# Patient Record
Sex: MALE | Race: WHITE | NOT HISPANIC OR LATINO | Employment: PART TIME | URBAN - NONMETROPOLITAN AREA
[De-identification: names, ages, dates, MRNs, and addresses within clinical notes are randomized per-mention and may not be internally consistent; named-entity substitution may affect disease eponyms.]

---
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
## 2023-03-09 ENCOUNTER — HOSPITAL ENCOUNTER (EMERGENCY)
Facility: HOSPITAL | Age: 23
Discharge: HOME | End: 2023-03-09
Attending: NURSE PRACTITIONER | Admitting: NURSE PRACTITIONER
Payer: COMMERCIAL

## 2023-03-09 VITALS
RESPIRATION RATE: 16 BRPM | SYSTOLIC BLOOD PRESSURE: 154 MMHG | DIASTOLIC BLOOD PRESSURE: 94 MMHG | OXYGEN SATURATION: 99 % | TEMPERATURE: 97.8 F | HEART RATE: 64 BPM

## 2023-03-09 DIAGNOSIS — H60.02 ABSCESS OF EARLOBE, LEFT: Primary | ICD-10-CM

## 2023-03-09 PROCEDURE — 99213 OFFICE O/P EST LOW 20 MIN: CPT | Performed by: NURSE PRACTITIONER

## 2023-03-09 PROCEDURE — G0463 HOSPITAL OUTPT CLINIC VISIT: HCPCS

## 2023-03-09 RX ORDER — SULFAMETHOXAZOLE/TRIMETHOPRIM 800-160 MG
1 TABLET ORAL 2 TIMES DAILY
Qty: 14 TABLET | Refills: 0 | Status: SHIPPED | OUTPATIENT
Start: 2023-03-09 | End: 2023-03-16

## 2023-03-09 ASSESSMENT — ENCOUNTER SYMPTOMS
SHORTNESS OF BREATH: 0
NAUSEA: 0
CHILLS: 0
FEVER: 0
PSYCHIATRIC NEGATIVE: 1
VOMITING: 0
DIARRHEA: 0
NECK PAIN: 0
NECK STIFFNESS: 0

## 2023-03-09 NOTE — ED TRIAGE NOTES
Patient presents to urgent care with c/o a bump behind his left ear. States he noticed it yesterday. States its uncomfortable and does have pressure into his jaw. Denies any dental issues, fever, chills, n/v.     Triage Assessment     Row Name 03/09/23 4299       Triage Assessment (Adult)    Airway WDL WDL       Respiratory WDL    Respiratory WDL WDL       Skin Circulation/Temperature WDL    Skin Circulation/Temperature WDL WDL       Cardiac WDL    Cardiac WDL WDL       Peripheral/Neurovascular WDL    Peripheral Neurovascular WDL WDL       Cognitive/Neuro/Behavioral WDL    Cognitive/Neuro/Behavioral WDL WDL

## 2023-03-09 NOTE — DISCHARGE INSTRUCTIONS
Bactrim as ordered  - Take entire course of antibiotic even if you start to feel better.  - Antibiotics can cause stomach upset including nausea and diarrhea. Read your bottle or ask the pharmacist if antibiotic can be taken with food to help prevent nausea. If you have symptoms of diarrhea you can take an over-the-counter probiotic and/or increase foods with probiotics such as yogurt, [place], sauerkraut.    Warm compresses to area 3-4 times a day    Alternate tylenol and ibuprofen as needed for pain    Follow-up with primary care provider or return to urgent care/ED with any worsening in condition or additional concerns.

## 2023-03-09 NOTE — ED PROVIDER NOTES
History     Chief Complaint   Patient presents with     Mass     HPI  Yovanny Bonner is a 22 year old male who presents to urgent care today ambulatory with complaints of an abscess to back of left earlobe which started 2 days ago.  Scant amount of purulent drainage.  Denies any inner ear pain, tinnitus, dizziness, vertigo or hearing loss.  Denies any fever, chills, nausea, vomiting, diarrhea, shortness of breath or chest pain.  Denies any dental issues. No trismus.  No difficulty swallowing.  No other concerns.    Allergies:  Allergies   Allergen Reactions     Banana Itching     Kiwi Extract Itching     Seasonal Allergies      Zoloft [Sertraline] Hives       Problem List:    Patient Active Problem List    Diagnosis Date Noted     Dental caries 01/24/2018     Priority: Medium     ADHD, hyperactive-impulsive type 10/02/2015     Priority: Medium     Autism spectrum disorder 03/13/2014     Priority: Medium     MARIBEL (generalized anxiety disorder) 03/13/2014     Priority: Medium     ADHD 07/26/2011     Priority: Medium     Other specified pervasive developmental disorders, current or active state 07/26/2011     Priority: Medium        Past Medical History:    Past Medical History:   Diagnosis Date     Anxiety disorder      Autistic disorder      Pervasive developmental disorder        Past Surgical History:    Past Surgical History:   Procedure Laterality Date     DENTAL SURGERY      No Comments Provided       Family History:    Family History   Problem Relation Age of Onset     Unknown/Adopted Father      No Known Problems Sister      No Known Problems Sister        Social History:  Marital Status:  Single [1]  Social History     Tobacco Use     Smoking status: Some Days     Smokeless tobacco: Never   Substance Use Topics     Alcohol use: No     Drug use: Not Currently     Types: Marijuana     Comment: Drug use: No        Medications:    sulfamethoxazole-trimethoprim (BACTRIM DS) 800-160 MG tablet      Review of Systems    Constitutional: Negative for chills and fever.   HENT: Positive for ear pain (Earlobe). Negative for dental problem, hearing loss and tinnitus.    Respiratory: Negative for shortness of breath.    Cardiovascular: Negative for chest pain.   Gastrointestinal: Negative for diarrhea, nausea and vomiting.   Musculoskeletal: Negative for neck pain and neck stiffness.   Skin: Negative for rash.   Psychiatric/Behavioral: Negative.      Physical Exam   BP: 154/94  Pulse: 64  Temp: 97.8  F (36.6  C)  Resp: 16  SpO2: 99 %    Physical Exam  Vitals and nursing note reviewed.   Constitutional:       General: He is not in acute distress.     Appearance: Normal appearance. He is not ill-appearing or toxic-appearing.   HENT:      Head: Normocephalic.      Right Ear: Tympanic membrane, ear canal and external ear normal. No mastoid tenderness.      Left Ear: Tympanic membrane, ear canal and external ear normal. No mastoid tenderness.      Ears:      Comments: Small abscess to back of left earlobe < 1 cm, scant amount of purulent drainage. No mastoid tenderness.   Cardiovascular:      Rate and Rhythm: Normal rate and regular rhythm.      Pulses: Normal pulses.      Heart sounds: Normal heart sounds.   Pulmonary:      Effort: Pulmonary effort is normal.      Breath sounds: Normal breath sounds.   Neurological:      Mental Status: He is alert.   Psychiatric:         Mood and Affect: Mood normal.       ED Course     No results found for this or any previous visit (from the past 24 hour(s)).    Medications - No data to display    Assessments & Plan (with Medical Decision Making)     I have reviewed the nursing notes.    I have reviewed the findings, diagnosis, plan and need for follow up with the patient.  (H60.02) Abscess of earlobe, left  (primary encounter diagnosis)  Plan:   Patient ambulatory with a nontoxic appearance.  Patient has a small abscess under 1 cm to back of earlobe with scant amount of purulent drainage.  Patient does  not want I&D completed.  No mastoid tenderness.  Denies any fever, chills, nausea, vomiting, diarrhea, shortness of breath or chest pain.  Denies any dental pain or trismus.  Denies any neck pain or stiffness.  Will treat with Bactrim.  Warm compresses to area 3-4 times a day.  Patient to follow-up with primary care provider or return to urgent care/ED with any worsening in condition or additional concerns.  Patient is in agreement with treatment plan.    Discharge Medication List as of 3/9/2023  3:50 PM      START taking these medications    Details   sulfamethoxazole-trimethoprim (BACTRIM DS) 800-160 MG tablet Take 1 tablet by mouth 2 times daily for 7 days, Disp-14 tablet, R-0, E-Prescribe           Final diagnoses:   Abscess of earlobe, left     3/9/2023   [facility]     Katy Nunez, BRAD  03/09/23 160

## 2023-03-09 NOTE — ED TRIAGE NOTES
Developed lump behind left ear yesterday states it 1/10 for discomfort. No drainage or redness noted     Triage Assessment     Row Name 03/09/23 9810       Triage Assessment (Adult)    Airway WDL WDL       Respiratory WDL    Respiratory WDL WDL       Skin Circulation/Temperature WDL    Skin Circulation/Temperature WDL WDL       Cardiac WDL    Cardiac WDL WDL       Peripheral/Neurovascular WDL    Peripheral Neurovascular WDL WDL       Cognitive/Neuro/Behavioral WDL    Cognitive/Neuro/Behavioral WDL WDL

## 2023-12-08 ENCOUNTER — OFFICE VISIT (OUTPATIENT)
Dept: FAMILY MEDICINE | Facility: OTHER | Age: 23
End: 2023-12-08
Attending: PHYSICIAN ASSISTANT
Payer: COMMERCIAL

## 2023-12-08 VITALS
SYSTOLIC BLOOD PRESSURE: 116 MMHG | WEIGHT: 149.6 LBS | OXYGEN SATURATION: 97 % | TEMPERATURE: 98.1 F | HEIGHT: 73 IN | BODY MASS INDEX: 19.83 KG/M2 | HEART RATE: 89 BPM | RESPIRATION RATE: 14 BRPM | DIASTOLIC BLOOD PRESSURE: 70 MMHG

## 2023-12-08 DIAGNOSIS — F90.9 ATTENTION DEFICIT HYPERACTIVITY DISORDER (ADHD), UNSPECIFIED ADHD TYPE: ICD-10-CM

## 2023-12-08 DIAGNOSIS — Z00.00 ROUTINE HISTORY AND PHYSICAL EXAMINATION OF ADULT: Primary | ICD-10-CM

## 2023-12-08 DIAGNOSIS — F41.9 ANXIETY AND DEPRESSION: ICD-10-CM

## 2023-12-08 DIAGNOSIS — F84.0 AUTISM SPECTRUM DISORDER: ICD-10-CM

## 2023-12-08 DIAGNOSIS — F32.A ANXIETY AND DEPRESSION: ICD-10-CM

## 2023-12-08 DIAGNOSIS — Z72.0 TOBACCO USE: ICD-10-CM

## 2023-12-08 PROCEDURE — 99395 PREV VISIT EST AGE 18-39: CPT | Performed by: PHYSICIAN ASSISTANT

## 2023-12-08 PROCEDURE — 99214 OFFICE O/P EST MOD 30 MIN: CPT | Mod: 25 | Performed by: PHYSICIAN ASSISTANT

## 2023-12-08 PROCEDURE — G0463 HOSPITAL OUTPT CLINIC VISIT: HCPCS

## 2023-12-08 ASSESSMENT — ENCOUNTER SYMPTOMS
ARTHRALGIAS: 1
HEMATURIA: 0
EYE PAIN: 0
ABDOMINAL PAIN: 0
CHILLS: 0
HEMATOCHEZIA: 0
PARESTHESIAS: 0
DIZZINESS: 1
HEARTBURN: 0
SHORTNESS OF BREATH: 0
PALPITATIONS: 0
WEAKNESS: 0
DIARRHEA: 0
NERVOUS/ANXIOUS: 1
JOINT SWELLING: 0
HEADACHES: 1
COUGH: 1
DYSURIA: 0
CONSTIPATION: 0
SORE THROAT: 0
FREQUENCY: 0
MYALGIAS: 1
NAUSEA: 0
FEVER: 0

## 2023-12-08 ASSESSMENT — PATIENT HEALTH QUESTIONNAIRE - PHQ9
SUM OF ALL RESPONSES TO PHQ QUESTIONS 1-9: 12
SUM OF ALL RESPONSES TO PHQ QUESTIONS 1-9: 12
10. IF YOU CHECKED OFF ANY PROBLEMS, HOW DIFFICULT HAVE THESE PROBLEMS MADE IT FOR YOU TO DO YOUR WORK, TAKE CARE OF THINGS AT HOME, OR GET ALONG WITH OTHER PEOPLE: SOMEWHAT DIFFICULT

## 2023-12-08 ASSESSMENT — PAIN SCALES - GENERAL: PAINLEVEL: NO PAIN (0)

## 2023-12-08 NOTE — NURSING NOTE
Patient presents to clinic for annual physical and to discuss medical marijuana card.  Sujata Allen LPN ....................  12/8/2023   8:38 AM  EXT [id number]

## 2023-12-08 NOTE — COMMUNITY RESOURCES LIST (ENGLISH)
12/08/2023   [facility]  N/A  For questions about this resource list or additional care needs, please contact your primary care clinic or care manager.  Phone: [phone]   Email: N/A   Address: [address] [place] [id number]   Hours: N/A        Financial Stability       Utility payment assistance  1  [employer] [facility] Office - Energy Assistance Program Distance: 0.69 miles      Phone/Virtual   [address] [place] [address]  Language: English  Hours: Mon - Thu 8:00 AM - 4:30 PM , Fri 8:00 AM - 12:00 PM  Fees: Free   Phone: (971) 792-3466 Email: max@[employer] Website: http://www.[employer]          Food and Nutrition       Food pantry  2  [facility] Distance: 1.8 miles      In-Person   [address] [place] [address]  Language: English  Hours: Mon - Thu 11:00 AM - 3:30 PM  Fees: Free   Phone: (219) 798-3336 Email: info@[employer] Website: https://[employer]     3  [facility] Food Shelf Distance: 14.72 miles      [facility]   [address] [place] [address]  Language: English  Hours: Thu 10:00 AM - 1:00 PM  Fees: Free   Phone: (311) 717-8490 Email: arnoldo@[employer] Website: https://www.[employer].com/[employer]/     SNAP application assistance  4  [facility] Distance: 0.55 miles      [address] [place] [address]  Language: English  Hours: Mon - Fri 8:00 AM - 4:30 PM  Fees: Free   Phone: (462) 936-6804 Website: https://www.[facility]./[facility]/Health-Human-Services     5  [facility] [employer] [facility] Distance: 0.56 miles      In-Person, Phone/Virtual   [address] [place] [address]  Language: English  Hours: Mon 8:00 AM - 4:30 PM Appt. Only, Tue - Thu 8:00 AM - 4:30 PM , Fri 8:00 AM - 4:30 PM Appt. Only  Fees: Free   Phone: (471) 805-9569 Website:  http://www.[employer].org          Transportation       Free or low-cost transportation  6  [facility] [employer] [facility] [employer] - Free or low-cost transportation Distance: 0.56 miles      In-Person   [address] [place] [address]  Language: English  Hours: Mon 8:00 AM - 4:30 PM Appt. Only, Tue - Thu 8:00 AM - 4:30 PM , Fri 8:00 AM - 4:30 PM Appt. Only  Fees: Free   Phone: (616) 107-7013 Website: http://www.[employer]          Important Numbers & Websites       Emergency Services   911  City Services   311  Poison Control   (616) 436-3736  Suicide Prevention Lifeline   (370) 432-1758 (TALK)  Child Abuse Hotline   (739) 925-3652 (4-A-Child)  Sexual Assault Hotline   (197) 197-8055 (HOPE)  National Runaway Safeline   (578) 267-9874 (RUNAWAY)  All-Options Talkline   (269) 850-5317  Substance Abuse Referral   (335) 792-2121 (HELP)

## 2023-12-08 NOTE — PROGRESS NOTES
"SUBJECTIVE:   Yovanny is a 23 year old, presenting for the following:  Physical        Healthy Habits:     Getting at least 3 servings of Calcium per day:  Yes    Bi-annual eye exam:  Yes    Dental care twice a year:  Yes    Sleep apnea or symptoms of sleep apnea:  None and Sleep apnea    Diet:  Regular (no restrictions)    Frequency of exercise:  2-3 days/week    Duration of exercise:  15-30 minutes    Taking medications regularly:  Yes    Medication side effects:  None    Additional concerns today:  No    Here for annual physical.     Has longstanding difficulties with ADHD, anxiety, depression. Known ASD. Has been on medications previously reports mental improvement or side effects.  Patient does have therapy scheduled for later this month.  He would like to have a diagnostic assessment completed.  He is also interested in obtaining medical marijuana card.  Reports when he uses marijuana helps him better with focus, helps control mood better.    STD concerns: No  Cholesterol/DM concerns/screening: NA  Prostate cancer screening discussed: Not indicated, patient is average risk and younger than 50.  Family history of colon or prostate CA?: Unknown  Colonoscopy: Not indicated, patient is average risk and younger than 45.  Tobacco use: Yes, not interested in cesation  Immunizations: Declined flu and COVID      Social History     Tobacco Use    Smoking status: Every Day     Types: Cigarettes    Smokeless tobacco: Never   Substance Use Topics    Alcohol use: No             12/8/2023     8:49 AM   Alcohol Use   Prescreen: >3 drinks/day or >7 drinks/week? No       Last PSA: No results found for: \"PSA\"    Reviewed orders with patient. Reviewed health maintenance and updated orders accordingly - Yes      Reviewed and updated as needed this visit by clinical staff   Tobacco  Allergies  Meds      Soc Hx        Reviewed and updated as needed this visit by Provider                 Past Medical History:   Diagnosis Date    " "Anxiety disorder     No Comments Provided    Autistic disorder     No Comments Provided    Pervasive developmental disorder     and Spectrum disorder diagnosed by psychiatry in [place].      Past Surgical History:   Procedure Laterality Date    DENTAL SURGERY      No Comments Provided       Review of Systems   Constitutional:  Negative for chills and fever.   HENT:  Negative for congestion, ear pain, hearing loss and sore throat.    Eyes:  Negative for pain and visual disturbance.   Respiratory:  Positive for cough. Negative for shortness of breath.    Cardiovascular:  Negative for chest pain, palpitations and peripheral edema.   Gastrointestinal:  Negative for abdominal pain, constipation, diarrhea, heartburn, hematochezia and nausea.   Genitourinary:  Negative for dysuria, frequency, genital sores, hematuria, impotence, penile discharge and urgency.   Musculoskeletal:  Positive for arthralgias and myalgias. Negative for joint swelling.   Skin:  Negative for rash.   Neurological:  Positive for dizziness and headaches. Negative for weakness and paresthesias.   Psychiatric/Behavioral:  Positive for mood changes. The patient is nervous/anxious.          OBJECTIVE:   /70   Pulse 89   Temp 98.1  F (36.7  C)   Resp 14   Ht 1.854 m (6' 1\")   Wt 67.9 kg (149 lb 9.6 oz)   SpO2 97%   BMI 19.74 kg/m      Physical Exam  GENERAL: healthy, alert and no distress  EYES: Eyes grossly normal to inspection, PERRL and conjunctivae and sclerae normal  HENT: ear canals and TM's normal, nose and mouth without ulcers or lesions  NECK: no adenopathy, no asymmetry, masses, or scars and thyroid normal to palpation  RESP: lungs clear to auscultation - no rales, rhonchi or wheezes  CV: regular rate and rhythm, normal S1 S2, no S3 or S4, no murmur, click or rub, no peripheral edema and peripheral pulses strong  MS: no gross musculoskeletal defects noted, no edema  SKIN: no suspicious lesions or rashes  NEURO: Normal strength and " tone, mentation intact and speech normal  PSYCH: mentation appears normal, affect normal/bright    Diagnostic Test Results:  none     ASSESSMENT/PLAN:       ICD-10-CM    1. Routine history and physical examination of adult  Z00.00       2. Tobacco use  Z72.0       3. Attention deficit hyperactivity disorder (ADHD), unspecified ADHD type  F90.9       4. Anxiety and depression  F41.9     F32.A       5. Autism spectrum disorder  F84.0         Up-to-date on preventative exams.  Patient declined flu and COVID vaccines.  Declined updating lab work.  Encourage focusing on healthy lifestyle.    2.  Chronic, not interested in cessation at this time.    3, 4, 5.  Chronic, has been stable without medications.  Has follow-up with mental scheduled for later this month.  Discussed with patient that no providers at [facility] are certified to certified medical marijuana use.  He can discuss this with mental health or follow-up with dispensary in [place].    Patient has been advised of split billing requirements and indicates understanding: Yes      COUNSELING:   Reviewed preventive health counseling, as reflected in patient instructions        He reports that he has been smoking cigarettes. He has never used smokeless tobacco.  Nicotine/Tobacco Cessation Plan:   Information offered: Patient not interested at this time            Jerilyn Escamilla PA-C  [facility] AND [facility]

## 2023-12-16 ENCOUNTER — OFFICE VISIT (OUTPATIENT)
Dept: FAMILY MEDICINE | Facility: OTHER | Age: 23
End: 2023-12-16
Payer: COMMERCIAL

## 2023-12-16 VITALS
SYSTOLIC BLOOD PRESSURE: 100 MMHG | WEIGHT: 165 LBS | DIASTOLIC BLOOD PRESSURE: 78 MMHG | TEMPERATURE: 98.7 F | OXYGEN SATURATION: 98 % | RESPIRATION RATE: 16 BRPM | HEART RATE: 117 BPM | BODY MASS INDEX: 21.77 KG/M2

## 2023-12-16 DIAGNOSIS — R51.9 ACUTE INTRACTABLE HEADACHE, UNSPECIFIED HEADACHE TYPE: Primary | ICD-10-CM

## 2023-12-16 DIAGNOSIS — Z02.89 ENCOUNTER TO OBTAIN EXCUSE FROM WORK: ICD-10-CM

## 2023-12-16 PROCEDURE — 250N000013 HC RX MED GY IP 250 OP 250 PS 637: Performed by: NURSE PRACTITIONER

## 2023-12-16 PROCEDURE — 99213 OFFICE O/P EST LOW 20 MIN: CPT | Performed by: NURSE PRACTITIONER

## 2023-12-16 PROCEDURE — G0463 HOSPITAL OUTPT CLINIC VISIT: HCPCS

## 2023-12-16 RX ORDER — KETOROLAC TROMETHAMINE 30 MG/ML
30 INJECTION, SOLUTION INTRAMUSCULAR; INTRAVENOUS ONCE
Status: DISCONTINUED | OUTPATIENT
Start: 2023-12-16 | End: 2023-12-16

## 2023-12-16 RX ORDER — IBUPROFEN 200 MG
800 TABLET ORAL ONCE
Status: COMPLETED | OUTPATIENT
Start: 2023-12-16 | End: 2023-12-16

## 2023-12-16 RX ADMIN — IBUPROFEN 800 MG: 200 TABLET, FILM COATED ORAL at 15:41

## 2023-12-16 ASSESSMENT — PAIN SCALES - GENERAL: PAINLEVEL: SEVERE PAIN (6)

## 2023-12-16 NOTE — LETTER
December 16, 2023      Yovanny Bonner  [address] [facility] [address]        To Whom It May Concern:    Yovanny Bonner  was seen on 12/16/23 for headache.  Please excuse him from work on 12/16/23 due to headache.  May return to work         Sincerely,        Aide Reese, NP

## 2023-12-16 NOTE — LETTER
December 16, 2023      Yovanny Bonner  [address] [place] [id number]        To Whom It May Concern:    Yovanny Bonner  was seen on 12/16/23 for headache and fatigue.  Please excuse him from work on 12/16/23 due to health condition.  May return to work on 12/17/23 if symptoms improving/resolved.        Sincerely,        Aide Reese, NP

## 2023-12-16 NOTE — NURSING NOTE
"Chief Complaint   Patient presents with    Headache     Since yesterday   He states he has had a migraine since yesterday and has vomited a few times.  Keyonna Flood LPN..................12/16/2023   2:54 PM      Initial /78   Pulse 117   Temp 98.7  F (37.1  C) (Temporal)   Resp 16   Wt 74.8 kg (165 lb)   SpO2 98%   BMI 21.77 kg/m   Estimated body mass index is 21.77 kg/m  as calculated from the following:    Height as of 12/8/23: 1.854 m (6' 1\").    Weight as of this encounter: 74.8 kg (165 lb).  Medication Review: complete    The next two questions are to help us understand your food security.  If you are feeling you need any assistance in this area, we have resources available to support you today.          12/8/2023   SDOH- Food Insecurity   Within the past 12 months, did you worry that your food would run out before you got money to buy more? Y   Within the past 12 months, did the food you bought just not last and you didn t have money to get more? Y         Health Care Directive:  Patient does not have a Health Care Directive or Living Will: Discussed advance care planning with patient; however, patient declined at this time.    Keyonna Flood LPN      "

## 2023-12-16 NOTE — PROGRESS NOTES
ASSESSMENT/PLAN:     I have reviewed the nursing notes.  I have reviewed the findings, diagnosis, plan and need for follow up with the patient.        1. Acute intractable headache, unspecified headache type    - ibuprofen (ADVIL/MOTRIN) tablet 800 mg x 1 administered in clinic    Patient with onset of headache since yesterday, most likely from lack of sleep, states he has only slept max of 30 minutes in the past 2 days.  No fevers or URI symptoms.  Reports hx of migraines, no prescription medication used.  He has not tried any OTC medication for current headache, offered Toradol injection but patient declines as he does not like shots so opted to take an oral Ibuprofen.      Headache is consistent with previous migraines.  Normal clinical exam without red flag findings.      Recommend use of over the counter medication such as tylenol, ibuprofen, naproxen or excedrin PRN  Non-pharmacological management: stretching, massage, ice, heat, exercise, rest, healthy diet, drink plenty of water, manage stress, reduce or avoid triggers, etc     Discussed warning signs/symptoms indicative of need to f/u  Follow up if symptoms persist or worsen or concerns      2.  Encounter to obtain excuse from work    Patient needs work note today to be excused from work due to headache and fatigue from lack of sleep  Work note provided for today only          I explained my diagnostic considerations and recommendations to the patient, who voiced understanding and agreement with the treatment plan. All questions were answered. We discussed potential side effects of any prescribed or recommended therapies, as well as expectations for response to treatments.    Aide Reese NP  [facility] AND [facility]      SUBJECTIVE:   Yovanny Bonner is a 23 year old male who presents to clinic today for the following health issues:  Headache, vomiting and work note    HPI  Duration: started yesterday   Intensity: ranges from 5/10 -  "8/10  Location:  central head  Aura: no   Noise sensitivity: yes   Light sensitivity: yes   Nausea or vomiting: vomiting x 3   Vision: changes, loss, or floaters: no   Ears: ringing, buzzing, pressure, pain, or plugged: no   Dizziness or light headedness:  only with standing up too quickly   Facial numbness, tingling, weakness, or asymmetry:  no   Extremity numbness, tingling, or weakness: tingling in legs attributes to RLS  Fevers or chills: intermittent chills, no known fevers  Nasal congestion, drainage, or sinus pressure:  congestion while laying flat   Sore throat, cough, shortness of breath:  no   Neck pain or stiffness: generalized body aches   Trauma or fall:  metal ceiling parts hit him on the head while at work 4 days ago, no noted injury    History of migraines: about the past 5 years   Family history of migraines:  yes - mother   Migraine triggers: lack of sleep, stress (states he just moved into new living situation, attending college and lack of sleep).   Typical Migraine pattern, duration and frequency:  usually about 2 x month lasting from 1 day to 2 days   Preventative migraine medications:  no   Abortive or treatment migraine medications: none   Previous advanced imagin2020 - \"no acute intracranial abnormality\"  Previous neurology evaluation: no         Past Medical History:   Diagnosis Date    Anxiety disorder     No Comments Provided    Autistic disorder     No Comments Provided    Pervasive developmental disorder     and Spectrum disorder diagnosed by psychiatry in [place].     Past Surgical History:   Procedure Laterality Date    DENTAL SURGERY      No Comments Provided     Social History     Tobacco Use    Smoking status: Every Day     Types: Cigarettes     Passive exposure: Never    Smokeless tobacco: Never   Substance Use Topics    Alcohol use: No     No current outpatient medications on file.     Allergies   Allergen Reactions    Banana Itching    Kiwi Extract Itching    Seasonal " Allergies     Zoloft [Sertraline] Hives         Past medical history, past surgical history, current medications and allergies reviewed and accurate to the best of my knowledge.        OBJECTIVE:     /78   Pulse 117   Temp 98.7  F (37.1  C) (Temporal)   Resp 16   Wt 74.8 kg (165 lb)   SpO2 98%   BMI 21.77 kg/m    Body mass index is 21.77 kg/m .        Physical Exam  General Appearance: fatigued adolescent male, appropriate appearance for age. No acute distress.  Easily and repeatedly falling asleep during visit.    Ears: Hearing intact to normal voice tone.    Eyes: conjunctivae normal without erythema or irritation, corneas clear, no drainage or crusting, no eyelid swelling.  PERRLA.  EOMs normal.    Orophayrnx: moist mucous membranes, posterior pharynx without erythema, tonsils without hypertrophy, no erythema, no exudates or petechiae, no post nasal drip seen, no trismus, voice clear.  Normal tongue protrusion and arises to midline, gag reflex present.  Sinuses:  No sinus tenderness upon palpation of the frontal or maxillary sinuses  Nose:  Bilateral nares: no erythema, no edema, no drainage or congestion   Neck: supple without adenopathy  Respiratory: normal chest wall and respirations.  Normal effort.  Clear to auscultation bilaterally, no wheezing, crackles or rhonchi.  No increased work of breathing.  No cough appreciated.  Cardiac: RRR with no murmurs  Musculoskeletal:  Equal movement of bilateral upper extremities with intact strength against resistance.  Equal movement of bilateral lower extremities with intact strength against resistance. Normal ROM of neck without difficulty.  Normal gait.    Neurological:  Missael score of 15.  Cranial nerves grossly tested and intact without deficit.  No gross muscle wasting and can ambulate and get onto exam table unassisted. Sensation to light touch intact.  No deficit with nose to finger rapid alternating movement. Pt able to walk heel to toe.  Bilaterally can touch heel to shin.  No pronator drift. Speech clear. Normal attention span.  Appropriate judgement.  No facial asymmetry or weakness.   Psychological: normal affect, alert, oriented, and pleasant.

## 2024-07-03 ENCOUNTER — THERAPY VISIT (OUTPATIENT)
Dept: CHIROPRACTIC MEDICINE | Facility: OTHER | Age: 24
End: 2024-07-03
Attending: CHIROPRACTOR
Payer: MEDICAID

## 2024-07-03 VITALS
RESPIRATION RATE: 16 BRPM | TEMPERATURE: 97.7 F | HEART RATE: 76 BPM | DIASTOLIC BLOOD PRESSURE: 78 MMHG | OXYGEN SATURATION: 97 % | SYSTOLIC BLOOD PRESSURE: 108 MMHG

## 2024-07-03 DIAGNOSIS — M54.9 CHRONIC BILATERAL BACK PAIN, UNSPECIFIED BACK LOCATION: ICD-10-CM

## 2024-07-03 DIAGNOSIS — M62.830 BACK MUSCLE SPASM: ICD-10-CM

## 2024-07-03 DIAGNOSIS — M99.04 SEGMENTAL AND SOMATIC DYSFUNCTION OF SACRAL REGION: ICD-10-CM

## 2024-07-03 DIAGNOSIS — G89.29 CHRONIC BILATERAL BACK PAIN, UNSPECIFIED BACK LOCATION: ICD-10-CM

## 2024-07-03 DIAGNOSIS — M54.16 LUMBAR BACK PAIN WITH RADICULOPATHY AFFECTING LEFT LOWER EXTREMITY: ICD-10-CM

## 2024-07-03 DIAGNOSIS — M99.02 SEGMENTAL AND SOMATIC DYSFUNCTION OF THORACIC REGION: Primary | ICD-10-CM

## 2024-07-03 DIAGNOSIS — R29.2: ICD-10-CM

## 2024-07-03 PROCEDURE — 99212 OFFICE O/P EST SF 10 MIN: CPT | Mod: 25 | Performed by: CHIROPRACTOR

## 2024-07-03 PROCEDURE — G0463 HOSPITAL OUTPT CLINIC VISIT: HCPCS | Mod: PO | Performed by: CHIROPRACTOR

## 2024-07-03 PROCEDURE — 98940 CHIROPRACT MANJ 1-2 REGIONS: CPT | Mod: AT | Performed by: CHIROPRACTOR
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
## 2024-07-16 ENCOUNTER — THERAPY VISIT (OUTPATIENT)
Dept: CHIROPRACTIC MEDICINE | Facility: OTHER | Age: 24
End: 2024-07-16
Attending: CHIROPRACTOR
Payer: MEDICAID

## 2024-07-16 VITALS — TEMPERATURE: 96.8 F | HEART RATE: 69 BPM | OXYGEN SATURATION: 98 % | RESPIRATION RATE: 16 BRPM

## 2024-07-16 DIAGNOSIS — H57.02 ANISOCORIA: ICD-10-CM

## 2024-07-16 DIAGNOSIS — M99.04 SEGMENTAL AND SOMATIC DYSFUNCTION OF SACRAL REGION: ICD-10-CM

## 2024-07-16 DIAGNOSIS — G89.29 CHRONIC BILATERAL BACK PAIN, UNSPECIFIED BACK LOCATION: ICD-10-CM

## 2024-07-16 DIAGNOSIS — M54.9 CHRONIC BILATERAL BACK PAIN, UNSPECIFIED BACK LOCATION: ICD-10-CM

## 2024-07-16 DIAGNOSIS — M99.02 SEGMENTAL AND SOMATIC DYSFUNCTION OF THORACIC REGION: Primary | ICD-10-CM

## 2024-07-16 DIAGNOSIS — M62.830 BACK MUSCLE SPASM: ICD-10-CM

## 2024-07-16 PROCEDURE — 98940 CHIROPRACT MANJ 1-2 REGIONS: CPT | Mod: AT | Performed by: CHIROPRACTOR

## 2024-07-16 PROCEDURE — G0463 HOSPITAL OUTPT CLINIC VISIT: HCPCS

## 2024-07-16 NOTE — PROGRESS NOTES
Bilateral upper back is constantly sore, tight, and pulling. 5/10 W24 8/10. Bilateral lower back is constantly hurting. 6/10 W24 8/10.   Leah Paz on 7/16/2024 at 9:29 AM    Reviewed by EW    Visit #:  2    Subjective:  Yovanny Bonner is a 23 year old male who is seen in f/u up for:        Segmental and somatic dysfunction of thoracic region  Segmental and somatic dysfunction of sacral region  Chronic bilateral back pain, unspecified back location  Back muscle spasm  Anisocoria.     Since last visit on 7/3/2024,  Yovanny Bonner reports: No noticeable change in symptoms after last encounter.  Has not been able to follow-up with medical provider as recommended.    Patient very preoccupied today with a scooter that he has recently built.  Feels that when he is writing this the vibration of the motorized scooter helps with symptoms.    Has not noticed autophagy which he reported on her last encounter.  Describes this more present with left arm symptoms as well as its relation to his blood sugar levels.    Patient does have healing wound on the lateral aspect of the right neck.  Describes a pus pocket which popped.  Has been self treating with antibiotic topical cream and bandages which seems to be helping.    (DVPRS) Pain Rating Score : 6-Hard to ignore, avoid usual activities (W24 8/10) (07/16/24 7327)     Objective:  The following was observed:  Pulse 69   Temp 96.8  F (36  C) (Tympanic)   Resp 16   SpO2 98%      DTR's upper extremity: +2     Right pupil slightly enlarged: equal and reactive  Reports past history.     Review of prior imaging from 2019 CT scan from a lumbar strain incident shows possible disorder of the coccyx.    P: palpatory tenderness present throughout paraspinal thoracic and lumbar regions bilaterally, left PSIS specifically, no jump sign elicited today:    A: static palpation demonstrates intersegmental asymmetry , thoracic, pelvis  R: motion palpation notes restricted motion, T4 , T12 , and  Sacrum   T: muscle spasm at level(s): Lumbar erector spine, Quad lumb, Rhomboids, and T-spine paraspinal Bilaterally and taut and tender fibers left sacrotuberous ligament eliciting mild jump sign    Segmental spinal dysfunction/restrictions found at:  :  T4 Extension restriction  T12 Extension restriction  Sacrum Left lateral flexion restricted and Extension restriction.      Assessment: Chiropractic assessment continues to show segmental/somatic dysfunction.  Although subjectively no improvement objectively no jump sign elicited, it does appear that palpatory tenderness has improved since our last encounter.    More concerns appear to be present today.  Anisocoria noted of the right pupil.  Patient reports that this [facility] within the past year.  Although I am unable to see any records of this patient reports that there was no concern following that appointment.    DTRs are not hyperreflexive today and appear equal symmetric bilaterally.  Continue to inform patient that I do recommend having a thorough exam with medical provider given his history.    Recommended physical therapy in addition to chiropractic care as multifaceted approach will likely yield best results for patient.    During course of our appointment patient initially appeared very tired occasionally nodding off.  Patient then began to become very focused on his motorized scooter, misplaced wallet.  Attributed this to ADHD.    Diagnoses:      1. Segmental and somatic dysfunction of thoracic region    2. Segmental and somatic dysfunction of sacral region    3. Chronic bilateral back pain, unspecified back location    4. Back muscle spasm    5. Anisocoria        Patient's condition:  Symptoms are unchanged    Treatment effectiveness:  Patient claims to feel looser post manipulation to his lower back      Procedures:  CMT:  29644 Chiropractic manipulative treatment 1-2 regions performed   Thoracic: Activator, T4,  T12, Prone  Pelvis: Activator and modified judit technique, Sacrum , Prone    Modalities:  None performed this visit    Therapeutic procedures:  None    Response to Treatment  Improvement noted of the lower back posttreatment    Prognosis: Guarded    Progress towards Goals: In progress     Recommendations:    Instructions: Follow-up with medical provider at soonest possible opportunity.    Follow-up:  Return to care in 1-2 weeks.

## 2024-10-30 ENCOUNTER — HOSPITAL ENCOUNTER (EMERGENCY)
Facility: OTHER | Age: 24
Discharge: HOME | End: 2024-10-30
Attending: FAMILY MEDICINE | Admitting: FAMILY MEDICINE
Payer: COMMERCIAL

## 2024-10-30 VITALS
TEMPERATURE: 97.8 F | BODY MASS INDEX: 20.54 KG/M2 | SYSTOLIC BLOOD PRESSURE: 123 MMHG | HEIGHT: 73 IN | WEIGHT: 155 LBS | RESPIRATION RATE: 16 BRPM | HEART RATE: 77 BPM | DIASTOLIC BLOOD PRESSURE: 87 MMHG | OXYGEN SATURATION: 99 %

## 2024-10-30 DIAGNOSIS — L08.9: ICD-10-CM

## 2024-10-30 PROCEDURE — 99283 EMERGENCY DEPT VISIT LOW MDM: CPT | Performed by: FAMILY MEDICINE

## 2024-10-30 RX ORDER — CEFTRIAXONE 1 G/1
1 INJECTION, POWDER, FOR SOLUTION INTRAMUSCULAR; INTRAVENOUS ONCE
Status: DISCONTINUED | OUTPATIENT
Start: 2024-10-30 | End: 2024-10-30

## 2024-10-30 ASSESSMENT — COLUMBIA-SUICIDE SEVERITY RATING SCALE - C-SSRS
6. HAVE YOU EVER DONE ANYTHING, STARTED TO DO ANYTHING, OR PREPARED TO DO ANYTHING TO END YOUR LIFE?: NO
2. HAVE YOU ACTUALLY HAD ANY THOUGHTS OF KILLING YOURSELF IN THE PAST MONTH?: NO
1. IN THE PAST MONTH, HAVE YOU WISHED YOU WERE DEAD OR WISHED YOU COULD GO TO SLEEP AND NOT WAKE UP?: NO

## 2024-10-30 ASSESSMENT — ENCOUNTER SYMPTOMS: WOUND: 1

## 2024-10-30 ASSESSMENT — ACTIVITIES OF DAILY LIVING (ADL): ADLS_ACUITY_SCORE: 0

## 2024-10-30 NOTE — ED NOTES
Pt discharged. I helped him fill his prescription. Supplied him with one of our donated food bags, warm soup, and sandwich and coffee at discharge. Pt understands that if his sore doesn't improve with oral antibiotics he may need to reconsider labs, IV antibiotics and imaging.

## 2024-10-30 NOTE — DISCHARGE INSTRUCTIONS
Yovanny    I hope the Augmentin helps you.    Thank you for choosing our Emergency Department for your care.     You may receive a phone call or letter for a survey about your care in our ED.  Please complete this as this is how we improve care for our patients.     If you have any questions after leaving the ED you can call or text me on my cell phone at [phone].  I am not on call so if I do not answer my phone please leave a message- I will get back to you.  If you are not doing well please return to the ED.     Sincerely,    Dr Jeremy Nunez M.D.  Medical Director  [facility] Emergency Department

## 2024-10-30 NOTE — ED PROVIDER NOTES
History     Chief Complaint   Patient presents with    Skin Irritation     The history is provided by the patient, the EMS personnel and medical records.     Yovanny Bonner is a 24 year old male here by EMS with concerns about an abscess and worms coming out of his neck.  He tells me this started just a few days ago- he could feels a worm under his skin by the left ear. He has been digging this out now for two days and it is painful. He thinks this is a bot fly.     Per EMS: He has an open wound under the left ear with pus, swelling and redness.    Allergies:  Allergies   Allergen Reactions    Banana Itching    Kiwi Extract Itching    Seasonal Allergies     Zoloft [Sertraline] Hives       Problem List:    Patient Active Problem List    Diagnosis Date Noted    Dental caries 01/24/2018     Priority: Medium    ADHD, hyperactive-impulsive type 10/02/2015     Priority: Medium    Aggressive behavior 08/05/2014     Priority: Medium    Anxiety state 08/05/2014     Priority: Medium     Formatting of this note might be different from the original. IMO Update      Auditory hallucinations 08/05/2014     Priority: Medium    Homicidal ideations 08/05/2014     Priority: Medium    Suicidal ideation 08/05/2014     Priority: Medium    Autism spectrum disorder 03/13/2014     Priority: Medium    MARIBEL (generalized anxiety disorder) 03/13/2014     Priority: Medium    ADHD 07/26/2011     Priority: Medium    Other specified pervasive developmental disorders, current or active state 07/26/2011     Priority: Medium        Past Medical History:    Past Medical History:   Diagnosis Date    Anxiety disorder     Autistic disorder     Pervasive developmental disorder        Past Surgical History:    Past Surgical History:   Procedure Laterality Date    DENTAL SURGERY      No Comments Provided       Family History:    Family History   Problem Relation Age of Onset    Unknown/Adopted Father     No Known Problems Sister     No Known Problems Sister   "      Social History:  Marital Status:  Single [1]  Social History     Tobacco Use    Smoking status: Every Day     Types: Cigarettes     Passive exposure: Never    Smokeless tobacco: Never   Vaping Use    Vaping status: Every Day   Substance Use Topics    Alcohol use: No    Drug use: Yes     Types: Marijuana        Medications:    amoxicillin-clavulanate (AUGMENTIN) 875-125 MG tablet          Review of Systems   Skin:  Positive for wound.   All other systems reviewed and are negative.      Physical Exam   BP: 123/87  Pulse: 77  Temp: 97.8  F (36.6  C)  Resp: 16  Height: 185.4 cm (6' 1\")  Weight: 70.3 kg (155 lb)  SpO2: 99 %      Physical Exam  Vitals and nursing note reviewed.   Constitutional:       General: He is not in acute distress.  HENT:      Left Ear: Tympanic membrane and ear canal normal.      Ears:      Comments: He has redness and swelling of the left ear lobe and infection below the ear.   Skin:     Comments: He has an open skin wound under the left ear. It has some pus and erythema. I do not see any significant skin infection anywhere else on his body, after full skin exam.            Assessments & Plan (with Medical Decision Making)  Yovanny Bonner is a 24 year old male here by EMS with concerns about an abscess and worms coming out of his neck.  He tells me this started just a few days ago- he could feels a worm under his skin by the left ear. He has been digging this out now for two days and it is painful. He thinks this is a bot fly.   Per EMS: He has an open wound under the left ear with pus, swelling and redness.  VS in the ED /87   Pulse 77   Temp 97.8  F (36.6  C) (Tympanic)   Resp 16   Ht 1.854 m (6' 1\")   Wt 70.3 kg (155 lb)   SpO2 99%   BMI 20.45 kg/m    Exam shows an open skin wound under the left ear. I do not see any maggots or other FB in the wound.   I want to do a few labs and a soft tissue CT neck as there is an actual open wound but he is refusing anything to do with " needles. We went over this for quite some time.   He did allow an ultrasound which shows no apparent abscess extension into the neck.   I am willing to give him some Augmentin as he is refusing a first antibiotic dose by IV, is refusing labs and refusing a CT scan with contrast. He denies drug use although I would not be surprised if this abscess was a result of skin picking seen with meth abuse.      I have reviewed the nursing notes.    I have reviewed the findings, diagnosis, plan and need for follow up with the patient.  Medical Decision Making  The patient's presentation was of low complexity (an acute and uncomplicated illness or injury).    The patient's evaluation involved:  an assessment requiring an independent historian (see separate area of note for details)    The patient's management necessitated moderate risk (prescription drug management including medications given in the ED).        New Prescriptions    AMOXICILLIN-CLAVULANATE (AUGMENTIN) 875-125 MG TABLET    Take 1 tablet by mouth 2 times daily.       Final diagnoses:   Infection of skin of neck       10/30/2024   [facility] AND [facility], Aldo Velez MD  10/30/24 0250

## 2024-10-30 NOTE — ED TRIAGE NOTES
Pt comes in with concern for scabs on the L and R side of his neck. Pt states he is concerned for an infection or that he may have been bit by bot flies.     Triage Assessment (Adult)       Row Name 10/30/24 0212          Triage Assessment    Airway WDL WDL        Respiratory WDL    Respiratory WDL WDL        Skin Circulation/Temperature WDL    Skin Circulation/Temperature WDL X        Cardiac WDL    Cardiac WDL WDL        Peripheral/Neurovascular WDL    Peripheral Neurovascular WDL WDL        Cognitive/Neuro/Behavioral WDL    Cognitive/Neuro/Behavioral WDL WDL

## 2024-10-31 ENCOUNTER — HOSPITAL ENCOUNTER (EMERGENCY)
Facility: OTHER | Age: 24
Discharge: HOME | End: 2024-10-31
Attending: FAMILY MEDICINE | Admitting: FAMILY MEDICINE
Payer: COMMERCIAL

## 2024-10-31 ENCOUNTER — HOSPITAL ENCOUNTER (EMERGENCY)
Facility: OTHER | Age: 24
Discharge: ANOTHER HOSPITAL | End: 2024-11-01
Attending: FAMILY MEDICINE | Admitting: FAMILY MEDICINE
Payer: COMMERCIAL

## 2024-10-31 ENCOUNTER — HOSPITAL ENCOUNTER (EMERGENCY)
Facility: OTHER | Age: 24
Discharge: HOME | End: 2024-10-31
Payer: COMMERCIAL

## 2024-10-31 VITALS
HEIGHT: 73 IN | BODY MASS INDEX: 20.54 KG/M2 | DIASTOLIC BLOOD PRESSURE: 82 MMHG | TEMPERATURE: 97.2 F | WEIGHT: 155 LBS | OXYGEN SATURATION: 98 % | SYSTOLIC BLOOD PRESSURE: 132 MMHG | HEART RATE: 99 BPM | RESPIRATION RATE: 16 BRPM

## 2024-10-31 VITALS
RESPIRATION RATE: 16 BRPM | OXYGEN SATURATION: 95 % | TEMPERATURE: 97.2 F | HEART RATE: 85 BPM | DIASTOLIC BLOOD PRESSURE: 77 MMHG | SYSTOLIC BLOOD PRESSURE: 147 MMHG

## 2024-10-31 VITALS
OXYGEN SATURATION: 97 % | HEART RATE: 94 BPM | DIASTOLIC BLOOD PRESSURE: 77 MMHG | TEMPERATURE: 97.2 F | RESPIRATION RATE: 16 BRPM | SYSTOLIC BLOOD PRESSURE: 124 MMHG

## 2024-10-31 DIAGNOSIS — R44.0 AUDITORY HALLUCINATIONS: ICD-10-CM

## 2024-10-31 DIAGNOSIS — F41.1 GAD (GENERALIZED ANXIETY DISORDER): ICD-10-CM

## 2024-10-31 DIAGNOSIS — L08.9: ICD-10-CM

## 2024-10-31 DIAGNOSIS — F84.0 AUTISM SPECTRUM DISORDER: ICD-10-CM

## 2024-10-31 LAB
ANION GAP SERPL CALCULATED.3IONS-SCNC: 12 MMOL/L (ref 7–15)
BASOPHILS # BLD AUTO: 0.1 10E3/UL (ref 0–0.2)
BASOPHILS NFR BLD AUTO: 1 %
BUN SERPL-MCNC: 11.2 MG/DL (ref 6–20)
CALCIUM SERPL-MCNC: 9.8 MG/DL (ref 8.8–10.4)
CHLORIDE SERPL-SCNC: 101 MMOL/L (ref 98–107)
CREAT SERPL-MCNC: 0.67 MG/DL (ref 0.67–1.17)
CRP SERPL-MCNC: 3.59 MG/L
EGFRCR SERPLBLD CKD-EPI 2021: >90 ML/MIN/1.73M2
EOSINOPHIL # BLD AUTO: 0.2 10E3/UL (ref 0–0.7)
EOSINOPHIL NFR BLD AUTO: 2 %
ERYTHROCYTE [DISTWIDTH] IN BLOOD BY AUTOMATED COUNT: 13 % (ref 10–15)
GLUCOSE SERPL-MCNC: 97 MG/DL (ref 70–99)
HCO3 SERPL-SCNC: 23 MMOL/L (ref 22–29)
HCT VFR BLD AUTO: 41.3 % (ref 40–53)
HGB BLD-MCNC: 13.8 G/DL (ref 13.3–17.7)
HOLD SPECIMEN: NORMAL
HOLD SPECIMEN: NORMAL
IMM GRANULOCYTES # BLD: 0 10E3/UL
IMM GRANULOCYTES NFR BLD: 0 %
LYMPHOCYTES # BLD AUTO: 1.6 10E3/UL (ref 0.8–5.3)
LYMPHOCYTES NFR BLD AUTO: 19 %
MCH RBC QN AUTO: 29.6 PG (ref 26.5–33)
MCHC RBC AUTO-ENTMCNC: 33.4 G/DL (ref 31.5–36.5)
MCV RBC AUTO: 89 FL (ref 78–100)
MONOCYTES # BLD AUTO: 1 10E3/UL (ref 0–1.3)
MONOCYTES NFR BLD AUTO: 12 %
NEUTROPHILS # BLD AUTO: 5.5 10E3/UL (ref 1.6–8.3)
NEUTROPHILS NFR BLD AUTO: 66 %
NRBC # BLD AUTO: 0 10E3/UL
NRBC BLD AUTO-RTO: 0 /100
PLATELET # BLD AUTO: 381 10E3/UL (ref 150–450)
POTASSIUM SERPL-SCNC: 3.8 MMOL/L (ref 3.4–5.3)
RBC # BLD AUTO: 4.66 10E6/UL (ref 4.4–5.9)
SODIUM SERPL-SCNC: 136 MMOL/L (ref 135–145)
WBC # BLD AUTO: 8.4 10E3/UL (ref 4–11)

## 2024-10-31 PROCEDURE — 80048 BASIC METABOLIC PNL TOTAL CA: CPT | Performed by: FAMILY MEDICINE

## 2024-10-31 PROCEDURE — 99284 EMERGENCY DEPT VISIT MOD MDM: CPT | Performed by: FAMILY MEDICINE

## 2024-10-31 PROCEDURE — 99285 EMERGENCY DEPT VISIT HI MDM: CPT

## 2024-10-31 PROCEDURE — 86140 C-REACTIVE PROTEIN: CPT | Performed by: FAMILY MEDICINE

## 2024-10-31 PROCEDURE — 85004 AUTOMATED DIFF WBC COUNT: CPT | Performed by: FAMILY MEDICINE

## 2024-10-31 PROCEDURE — 99283 EMERGENCY DEPT VISIT LOW MDM: CPT

## 2024-10-31 PROCEDURE — 36415 COLL VENOUS BLD VENIPUNCTURE: CPT | Performed by: FAMILY MEDICINE

## 2024-10-31 PROCEDURE — 82947 ASSAY GLUCOSE BLOOD QUANT: CPT | Performed by: FAMILY MEDICINE

## 2024-10-31 RX ORDER — ACETAMINOPHEN 500 MG
1000 TABLET ORAL EVERY 4 HOURS PRN
Status: DISCONTINUED | OUTPATIENT
Start: 2024-10-31 | End: 2024-10-31 | Stop reason: HOSPADM

## 2024-10-31 ASSESSMENT — COLUMBIA-SUICIDE SEVERITY RATING SCALE - C-SSRS

## 2024-10-31 ASSESSMENT — ACTIVITIES OF DAILY LIVING (ADL)
ADLS_ACUITY_SCORE: 0

## 2024-10-31 ASSESSMENT — ENCOUNTER SYMPTOMS
SORE THROAT: 1
WOUND: 1

## 2024-10-31 NOTE — ED NOTES
Discharge instructions reviewed with pt. He has no questions at this time. Gave him a cup of coffee at discharge.

## 2024-10-31 NOTE — ED TRIAGE NOTES
Pt brought into the ER after sleeping [place] the library. He was hard to arouse, and was detained for disorderly conduct. He was then brought to the ER for evaluation. Pt has no concerns for his health, he has beens een here the last two days for an abscess that has improved. Pt declines any testing. Per PD pt can be discharged if he is cooperative.      Triage Assessment (Adult)       Row Name 10/31/24 1736          Triage Assessment    Airway WDL WDL        Respiratory WDL    Respiratory WDL WDL        Skin Circulation/Temperature WDL    Skin Circulation/Temperature WDL WDL        Cardiac WDL    Cardiac WDL WDL        Peripheral/Neurovascular WDL    Peripheral Neurovascular WDL WDL        Cognitive/Neuro/Behavioral WDL    Cognitive/Neuro/Behavioral WDL WDL

## 2024-10-31 NOTE — ED NOTES
Pt does not want evaluation and is upset PD brought him here. PD was going to arrest him for disorderly conduct. Pt is refusing any testing. PD is not electing to press charges and have no concern with him leaving.

## 2024-10-31 NOTE — ED PROVIDER NOTES
History     Chief Complaint   Patient presents with    Wound Infection     The history is provided by the patient.     Yovanny Bonner is a 24 year old male who is back tonight with concerns that he has a bot fly or something crawling under his skin. He has a skin wound under the left ear.  He was seen here last night and not willing to let us do labs. I did look with ultrasound and sent him out on antibiotics. He says he is taking the Augmentin.    Allergies:  Allergies   Allergen Reactions    Banana Itching    Kiwi Extract Itching    Seasonal Allergies     Zoloft [Sertraline] Hives       Problem List:    Patient Active Problem List    Diagnosis Date Noted    Dental caries 01/24/2018     Priority: Medium    ADHD, hyperactive-impulsive type 10/02/2015     Priority: Medium    Aggressive behavior 08/05/2014     Priority: Medium    Anxiety state 08/05/2014     Priority: Medium     Formatting of this note might be different from the original. IMO Update      Auditory hallucinations 08/05/2014     Priority: Medium    Homicidal ideations 08/05/2014     Priority: Medium    Suicidal ideation 08/05/2014     Priority: Medium    Autism spectrum disorder 03/13/2014     Priority: Medium    MARIBEL (generalized anxiety disorder) 03/13/2014     Priority: Medium    ADHD 07/26/2011     Priority: Medium    Other specified pervasive developmental disorders, current or active state 07/26/2011     Priority: Medium        Past Medical History:    Past Medical History:   Diagnosis Date    Anxiety disorder     Autistic disorder     Pervasive developmental disorder        Past Surgical History:    Past Surgical History:   Procedure Laterality Date    DENTAL SURGERY      No Comments Provided       Family History:    Family History   Problem Relation Age of Onset    Unknown/Adopted Father     No Known Problems Sister     No Known Problems Sister        Social History:  Marital Status:  Single [1]  Social History     Tobacco Use    Smoking status:  "Every Day     Types: Cigarettes     Passive exposure: Never    Smokeless tobacco: Never   Vaping Use    Vaping status: Every Day   Substance Use Topics    Alcohol use: No    Drug use: Yes     Types: Marijuana        Medications:    amoxicillin-clavulanate (AUGMENTIN) 875-125 MG tablet          Review of Systems   Skin:  Positive for wound.   All other systems reviewed and are negative.      Physical Exam   BP: 132/82  Pulse: 99  Temp: 97.2  F (36.2  C)  Resp: 16  Height: 185.4 cm (6' 1\")  Weight: 70.3 kg (155 lb)  SpO2: 98 %      Physical Exam  Vitals and nursing note reviewed.   Neck:      Comments: Exam of the skin wound inferior to the left ear shows less swelling and less redness. There is still drainage from the wound.   Neurological:      Mental Status: He is alert.         Results for orders placed or performed during the hospital encounter of 10/31/24 (from the past 24 hours)   CBC with platelets differential    Narrative    The following orders were created for panel order CBC with platelets differential.  Procedure                               Abnormality         Status                     ---------                               -----------         ------                     CBC with platelets and d...[[id number]]                      Final result                 Please view results for these tests on the individual orders.   CRP inflammation   Result Value Ref Range    CRP Inflammation 3.59 <5.00 mg/L   Basic metabolic panel   Result Value Ref Range    Sodium 136 135 - 145 mmol/L    Potassium 3.8 3.4 - 5.3 mmol/L    Chloride 101 98 - 107 mmol/L    Carbon Dioxide (CO2) 23 22 - 29 mmol/L    Anion Gap 12 7 - 15 mmol/L    Urea Nitrogen 11.2 6.0 - 20.0 mg/dL    Creatinine 0.67 0.67 - 1.17 mg/dL    GFR Estimate >90 >60 mL/min/1.73m2    Calcium 9.8 8.8 - 10.4 mg/dL    Glucose 97 70 - 99 mg/dL   CBC with platelets and differential   Result Value Ref Range    WBC Count 8.4 4.0 - 11.0 10e3/uL    RBC Count 4.66 4.40 " "- 5.90 10e6/uL    Hemoglobin 13.8 13.3 - 17.7 g/dL    Hematocrit 41.3 40.0 - 53.0 %    MCV 89 78 - 100 fL    MCH 29.6 26.5 - 33.0 pg    MCHC 33.4 31.5 - 36.5 g/dL    RDW 13.0 10.0 - 15.0 %    Platelet Count 381 150 - 450 10e3/uL    % Neutrophils 66 %    % Lymphocytes 19 %    % Monocytes 12 %    % Eosinophils 2 %    % Basophils 1 %    % Immature Granulocytes 0 %    NRBCs per 100 WBC 0 <1 /100    Absolute Neutrophils 5.5 1.6 - 8.3 10e3/uL    Absolute Lymphocytes 1.6 0.8 - 5.3 10e3/uL    Absolute Monocytes 1.0 0.0 - 1.3 10e3/uL    Absolute Eosinophils 0.2 0.0 - 0.7 10e3/uL    Absolute Basophils 0.1 0.0 - 0.2 10e3/uL    Absolute Immature Granulocytes 0.0 <=0.4 10e3/uL    Absolute NRBCs 0.0 10e3/uL   Extra Tube    Narrative    The following orders were created for panel order Extra Tube.  Procedure                               Abnormality         Status                     ---------                               -----------         ------                     Extra Blue Top Tube[[id number]]                              In process                 Extra Green Top (Lithium...[[id number]]                      In process                   Please view results for these tests on the individual orders.       Medications   acetaminophen (TYLENOL) tablet 1,000 mg (has no administration in time range)       Assessments & Plan (with Medical Decision Making)  Yovanny Bonner is a 24 year old male who is back tonight with concerns that he has a bot fly or something crawling under his skin. He has a skin wound under the left ear.  He was seen here last night and not willing to let us do labs. I did look with ultrasound and sent him out on antibiotics. He says he is taking the Augmentin.  VS in the ED /82   Pulse 99   Temp 97.2  F (36.2  C) (Tympanic)   Resp 16   Ht 1.854 m (6' 1\")   Wt 70.3 kg (155 lb)   SpO2 98%   BMI 20.45 kg/m    Exam shows a wound with less redness and less swelling than when he was here last night.  Labs " show CBC normal, BMP normal, CRP normal.  He is already on antibiotics and I think they are working well, considering that the wound looks better in 24 hours on the meds.      I have reviewed the nursing notes.    I have reviewed the findings, diagnosis, plan and need for follow up with the patient.  Medical Decision Making  The patient's presentation was of low complexity (an acute and uncomplicated illness or injury).    The patient's evaluation involved:  an assessment requiring an independent historian (see separate area of note for details)  ordering and/or review of 3+ test(s) in this encounter (see separate area of note for details)    The patient's management necessitated only low risk treatment.      Final diagnoses:   Infection of skin of neck       10/31/2024   [facility] AND [facility], Aldo Velez MD  10/31/24 0609

## 2024-10-31 NOTE — ED TRIAGE NOTES
Pt coming in to the ER to have a wound under his L ear evaluated. He was seen yesterday and refused any testing that may require a needle. He is returning tonight as he feels symptoms have worsened and he is willing to let us draw labs and do imaging here tonight.     Triage Assessment (Adult)       Row Name 10/31/24 0453          Triage Assessment    Airway WDL WDL        Respiratory WDL    Respiratory WDL WDL        Skin Circulation/Temperature WDL    Skin Circulation/Temperature WDL X        Cardiac WDL    Cardiac WDL WDL        Peripheral/Neurovascular WDL    Peripheral Neurovascular WDL WDL        Cognitive/Neuro/Behavioral WDL    Cognitive/Neuro/Behavioral WDL WDL

## 2024-10-31 NOTE — DISCHARGE INSTRUCTIONS
Yovanny    Keep taking the Augmentin please.  Your labs look good and I think the Augmentin is working well.     Thank you for choosing our Emergency Department for your care.     You may receive a phone call or letter for a survey about your care in our ED.  Please complete this as this is how we improve care for our patients.     If you have any questions after leaving the ED you can call or text me on my cell phone at [phone].  I am not on call so if I do not answer my phone please leave a message- I will get back to you.  If you are not doing well please return to the ED.     Sincerely,    Dr Jeremy Nunez M.D.  Medical Director  [facility] Emergency Department

## 2024-11-01 ENCOUNTER — TELEPHONE (OUTPATIENT)
Dept: BEHAVIORAL HEALTH | Facility: HOSPITAL | Age: 24
End: 2024-11-01

## 2024-11-01 ENCOUNTER — HOSPITAL ENCOUNTER (INPATIENT)
Facility: HOSPITAL | Age: 24
LOS: 3 days | Discharge: HOME | End: 2024-11-04
Attending: STUDENT IN AN ORGANIZED HEALTH CARE EDUCATION/TRAINING PROGRAM | Admitting: STUDENT IN AN ORGANIZED HEALTH CARE EDUCATION/TRAINING PROGRAM
Payer: COMMERCIAL

## 2024-11-01 DIAGNOSIS — F90.1 ADHD, HYPERACTIVE-IMPULSIVE TYPE: Primary | ICD-10-CM

## 2024-11-01 DIAGNOSIS — L08.9 LOCAL INFECTION OF SKIN AND SUBCUTANEOUS TISSUE: ICD-10-CM

## 2024-11-01 DIAGNOSIS — R21 RASH: ICD-10-CM

## 2024-11-01 LAB — SARS-COV-2 RNA RESP QL NAA+PROBE: NEGATIVE

## 2024-11-01 PROCEDURE — 99222 1ST HOSP IP/OBS MODERATE 55: CPT | Performed by: NURSE PRACTITIONER

## 2024-11-01 PROCEDURE — 250N000013 HC RX MED GY IP 250 OP 250 PS 637: Performed by: NURSE PRACTITIONER

## 2024-11-01 PROCEDURE — 99222 1ST HOSP IP/OBS MODERATE 55: CPT | Mod: AI | Performed by: NURSE PRACTITIONER

## 2024-11-01 PROCEDURE — 124N000001 HC R&B MH

## 2024-11-01 PROCEDURE — 87635 SARS-COV-2 COVID-19 AMP PRB: CPT | Performed by: FAMILY MEDICINE

## 2024-11-01 RX ORDER — NEOMYCIN/BACITRACIN/POLYMYXINB 3.5-400-5K
OINTMENT (GRAM) TOPICAL 2 TIMES DAILY
Status: COMPLETED | OUTPATIENT
Start: 2024-11-01 | End: 2024-11-03

## 2024-11-01 RX ORDER — OLANZAPINE 10 MG/2ML
10 INJECTION, POWDER, FOR SOLUTION INTRAMUSCULAR 3 TIMES DAILY PRN
Status: DISCONTINUED | OUTPATIENT
Start: 2024-11-01 | End: 2024-11-04 | Stop reason: HOSPADM

## 2024-11-01 RX ORDER — LORAZEPAM 2 MG/ML
2 INJECTION INTRAMUSCULAR EVERY 8 HOURS PRN
Status: DISCONTINUED | OUTPATIENT
Start: 2024-11-01 | End: 2024-11-04 | Stop reason: HOSPADM

## 2024-11-01 RX ORDER — DIPHENHYDRAMINE HCL 50 MG
50 CAPSULE ORAL EVERY 8 HOURS PRN
Status: DISCONTINUED | OUTPATIENT
Start: 2024-11-01 | End: 2024-11-04 | Stop reason: HOSPADM

## 2024-11-01 RX ORDER — HALOPERIDOL 5 MG/ML
5 INJECTION INTRAMUSCULAR EVERY 8 HOURS PRN
Status: DISCONTINUED | OUTPATIENT
Start: 2024-11-01 | End: 2024-11-04 | Stop reason: HOSPADM

## 2024-11-01 RX ORDER — MAGNESIUM HYDROXIDE/ALUMINUM HYDROXICE/SIMETHICONE 120; 1200; 1200 MG/30ML; MG/30ML; MG/30ML
30 SUSPENSION ORAL EVERY 4 HOURS PRN
Status: DISCONTINUED | OUTPATIENT
Start: 2024-11-01 | End: 2024-11-04 | Stop reason: HOSPADM

## 2024-11-01 RX ORDER — HYDROXYZINE HYDROCHLORIDE 25 MG/1
25 TABLET, FILM COATED ORAL EVERY 4 HOURS PRN
Status: DISCONTINUED | OUTPATIENT
Start: 2024-11-01 | End: 2024-11-04 | Stop reason: HOSPADM

## 2024-11-01 RX ORDER — OLANZAPINE 10 MG/1
10 TABLET ORAL 3 TIMES DAILY PRN
Status: DISCONTINUED | OUTPATIENT
Start: 2024-11-01 | End: 2024-11-04 | Stop reason: HOSPADM

## 2024-11-01 RX ORDER — LORAZEPAM 1 MG/1
2 TABLET ORAL EVERY 8 HOURS PRN
Status: DISCONTINUED | OUTPATIENT
Start: 2024-11-01 | End: 2024-11-04 | Stop reason: HOSPADM

## 2024-11-01 RX ORDER — HALOPERIDOL 5 MG/1
5 TABLET ORAL EVERY 8 HOURS PRN
Status: DISCONTINUED | OUTPATIENT
Start: 2024-11-01 | End: 2024-11-04 | Stop reason: HOSPADM

## 2024-11-01 RX ORDER — DIPHENHYDRAMINE HYDROCHLORIDE 50 MG/ML
50 INJECTION INTRAMUSCULAR; INTRAVENOUS EVERY 8 HOURS PRN
Status: DISCONTINUED | OUTPATIENT
Start: 2024-11-01 | End: 2024-11-04 | Stop reason: HOSPADM

## 2024-11-01 RX ORDER — POLYETHYLENE GLYCOL 3350 17 G/17G
17 POWDER, FOR SOLUTION ORAL DAILY PRN
Status: DISCONTINUED | OUTPATIENT
Start: 2024-11-01 | End: 2024-11-04 | Stop reason: HOSPADM

## 2024-11-01 RX ORDER — ACETAMINOPHEN 325 MG/1
650 TABLET ORAL EVERY 4 HOURS PRN
Status: DISCONTINUED | OUTPATIENT
Start: 2024-11-01 | End: 2024-11-04 | Stop reason: HOSPADM

## 2024-11-01 RX ADMIN — BACITRACIN ZINC, NEOMYCIN, POLYMYXIN B 1 G: 400; 3.5; 5 OINTMENT TOPICAL at 20:38

## 2024-11-01 RX ADMIN — BACITRACIN ZINC, NEOMYCIN, POLYMYXIN B 1 G: 400; 3.5; 5 OINTMENT TOPICAL at 12:24

## 2024-11-01 RX ADMIN — AMOXICILLIN AND CLAVULANATE POTASSIUM 1 TABLET: 875; 125 TABLET, FILM COATED ORAL at 12:23

## 2024-11-01 RX ADMIN — AMOXICILLIN AND CLAVULANATE POTASSIUM 1 TABLET: 875; 125 TABLET, FILM COATED ORAL at 20:38

## 2024-11-01 ASSESSMENT — ACTIVITIES OF DAILY LIVING (ADL)
ADLS_ACUITY_SCORE: 0
DRESS: SCRUBS (BEHAVIORAL HEALTH);INDEPENDENT
ADLS_ACUITY_SCORE: 0
ADLS_ACUITY_SCORE: 0
ORAL_HYGIENE: INDEPENDENT;PROMPTS
ADLS_ACUITY_SCORE: 0
LAUNDRY: UNABLE TO COMPLETE
ADLS_ACUITY_SCORE: 0
HYGIENE/GROOMING: INDEPENDENT;PROMPTS
ADLS_ACUITY_SCORE: 0

## 2024-11-01 NOTE — ED NOTES
Called [facility] 5th floor and spoke Fauzia.  This writer inquired about a direct admit from our facility to theirs.   aFuzia will review chart and call back.    Felicia Oropeza RN on 10/31/2024 at 10:08 PM

## 2024-11-01 NOTE — PROGRESS NOTES
Social Service Psychosocial Assessment  Presenting Problem:   Patient was admitted for psychosis, delusions, and hallucinations. Patient was admitted after he was picked up by police for a disorderly conduct.   Marital Status:   Not    Spouse / Children:    Never / no children   Psychiatric TX HX:     He has ADHD, auditory hallucinations, autism, history of SI and HI.     Suicide Risk Assessment: Patient denies SI for admit, denies hx of SI, and denies SI for today.   Access to Lethal Means (explain):   Patient denies access to lethal means.   Family Psych HX:   Unknown - patient did not  answer the question   A & Ox:   X 3  Medication Adherence:   Unknown, please refer to H&P for further details.   Medical Issues:    Unknown, please refer to H&P for further details.   Visual -Motor Functioning:   Ok. No known issues noticed for assessment.   Communication Skills /Needs:   Ok. Patient was very tired and dis not answer all the questions.   Ethnicity:   White     Spirituality/Roman Catholic Affiliation:   Patient stated none  Clergy Request:   No   History:   Patient denies hx of  service.   Living Situation:   He is homeless.   ADL s: Independently   Education:  Unknown. Patient did not answer the question   Financial Situation:   Unknown. Patient did not answer the question   Occupation:   Unknown. Patient did not answer the question   Leisure & Recreation:   Unknown. Patient did not answer the question   Childhood History:   Patient stated he gre up in [place]   Trauma Abuse HX:    Unknown. Patient did not answer the question   Relationship / Sexuality:    Unknown. Patient did not answer the question   Substance Use/ Abuse: Smokes tobacco, Patient reports he uses Marijuana. Patient refused UA   Chemical Dependency Treatment HX:    Unknown. Patient did not answer the question   Legal Issues:   He did get a disorderly conduct charge this 10/31/24 from fighting with the police near  the [employer] [place]. Uncertain of PD will follow through with the charges.   Significant Life Events:   Being picked up by police   Strengths:   In a safe place. Has some medical services. Open to services   Challenges /Limitation:   Current MI and SI. Possible drug use. Homeless ness   Patient Support Contact (Include name, relationship, number, and summary of conversation):    No TOI signed at this time    Interventions:     Vulnerable Adult/Child Report - [facility]   Community-Based Programs - [facility]   Medical/Dental Care - Joel Mendoza MD  [phone] - [facility].   Home Care Na   CD Evaluation/Rule 25/Aftercare Might benefit. ER notes stated possible Substance use.   Medication Management -Would benefit   Individual Therapy -Might benefit   Clergy Request - Not currently interested   Housing/Placement -Homeless   Case Management -Would benefit   Insurance Coverage -COMMERCIAL/IMCARE MNCARE   Financial Assistance -Would benefit   Commit/Leon Screening ?????  Suicide Risk Assessment - Patient denies SI for admit, denies hx of SI, and denies SI for today.   High Risk Safety Plan - Talk to supports; Call crisis lines; Go to local ER if feeling suicidal.   AGUSTÍN Gallo  11/1/2024  10:25 AM

## 2024-11-01 NOTE — PROGRESS NOTES
11/01/24 1107   Patient Belongings   Did you bring any home meds/supplements to the hospital?  Yes   Disposition of meds  Sent to security/pharmacy per site process   Patient Belongings locker   Patient Belongings Put in Hospital Secure Location (Security or Locker, etc.) clothing;keys;other (see comments);shoes;wallet   Belongings Search Yes   Clothing Search Yes   Second Staff Sarah   Comment Blue pair of pants, grey pair of sweats, grey pair of jeans, pair of stripped socks, brown belt, green long sleeve, 1 pk of matches, body armor pair of shoes, patch design blanket, grey arby hat, vasoline, 2 blue coats, 5 sugar pks, 1 piece of whopper candy, round magnet w/copper wire, comb.     List items sent to safe: brown wallet, EBT card, [place] ID card, social ID card, osha card, [employer] card, misc/papers, key.    All other belongings put in assigned cubby in belongings room.     I have reviewed my belongings list on admission and verify that it is correct.     Patient signature_______________________________    Second staff witness (if patient unable to sign) ______________________________       I have received all my belongings at discharge.    Patient signature________________________________    Helenaaro   11/1/2024  11:11 AM

## 2024-11-01 NOTE — ED TRIAGE NOTES
"Pt is here today for the 4th time in the past 48 hours, 3rd time in the past 24 hours.   Pt states that he is concerned that he has a bug moving around inside of his neck and chest.   Pt states that when he is in the \"the cold, he can feel the bug moving around inside of me\"  /77   Pulse 94   Temp 97.2  F (36.2  C) (Tympanic)   Resp 16   SpO2 97%   Felicia Oropeza RN on 10/31/2024 at 9:42 PM       Triage Assessment (Adult)       Row Name 10/31/24 2139          Triage Assessment    Airway WDL WDL        Respiratory WDL    Respiratory WDL WDL        Skin Circulation/Temperature WDL    Skin Circulation/Temperature WDL WDL        Cardiac WDL    Cardiac WDL WDL        Peripheral/Neurovascular WDL    Peripheral Neurovascular WDL WDL        Cognitive/Neuro/Behavioral WDL    Cognitive/Neuro/Behavioral WDL WDL                     "

## 2024-11-01 NOTE — ED NOTES
11/01/24 0125   Transfer Checklist   Reason for Transfer Physician Request   Accepting [facility]   Sending Physician Dr. Nunez   Report to Transfer Team Yes   Report to Receiving Facility Yes   Receiving Facility Phone Number [phone]   Spoke with SHAGGY Bundy
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
## 2024-11-01 NOTE — ED NOTES
Tito called for an update.   They are requesting that pt not arrive between 7 and 8am as that is their shift change.    Felicia Oropeza RN on 11/1/2024 at 5:45 AM
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
## 2024-11-01 NOTE — ED NOTES
Spoke with Topher from [facility] and he requested a covid test for patient for placement in [place]. [place] will take patient if his covid test comes back negative. Call [place] once test comes back.

## 2024-11-01 NOTE — DISCHARGE INSTRUCTIONS
Behavioral Discharge Planning and Instructions    Summary: Patient was admitted for psychosis.     Main Diagnosis: Psychosis     Health Care Follow-up:     [facility] - [facility]  [address], [address]   [place], [place] [address]  Phone Number: [phone]  Fax Number: [phone]    Psychiatry & Therapy Resources   (offers both psychiatry/medication management & therapy)     [place] - [phone]   [address] [place] [address]     [facility] - [phone]   [address] [place] [address]     [address],    [place] [address]     [address] [place] [address]     [facility]   [address], [facility] [address] [address]     [facility] Office - [phone]   [address] [place] [address]     [place] [facility] - [phone]   [address] [facility] [address]   [place] [address]     [facility] - [phone]    [address] [place] [address]      [facility] - [phone]   [address] [place] [address]      [facility] - [phone]   [address], [place] [address]     [facility] - [phone]   [address]    [place] [address]     [facility] - [phone]    [address] [place] [address]          Therapy Resources     [facility] - [phone], [phone], [phone], or [phone]   ([facility])   [address] [facility]   [place] [address]     [facility] - [phone]    [address] [facility]    [address]    [place] [address]      [address] [place] [address]     [facility] - [phone]   [address] [place] [address]     [facility] - [phone]   [address] [address] (Located in the [facility]   [facility] [address]     [facility]  - [phone]    [address]    [place] [address]     [facility] - [phone]    [address] [place] [address]     [facility], [facility] - [phone]    [address],    [place] [address]     [facility] - [phone]    [address] [place], [place], [address]     [facility] - [phone]    [address] [place] [address], [address]    [place] [address]       [address] [facility], [address]    [place] [address]     Liz Ayon, MS, LP - [phone]    [address],     [place] [address]     [facility] - [phone]    [address] [place] [address] [address],    [place] [address]     [facility] - [phone]    [address] [place] [address] [place] [address]      Attend all scheduled appointments with your outpatient providers. Call at least 24 hours in advance if you need to reschedule an appointment to ensure continued access to your outpatient providers.     Major Treatments, Procedures and Findings:  You were provided with: a psychiatric assessment, assessed for medical stability, medication evaluation and/or management, group therapy, family therapy, individual therapy, CD evaluation/assessment, milieu management, and medical interventions    Symptoms to Report: feeling more aggressive, increased confusion, losing more sleep, mood getting worse, or thoughts of suicide    Early warning signs can include: increased depression or anxiety sleep disturbances increased thoughts or behaviors of suicide or self-harm  increased unusual thinking, such as paranoia or hearing voices      Resources:   Crisis Intervention: 761.204.9232 or 661-765-8611 (TTY: 968.291.5152).  Call anytime for help.  National [place] on Mental Illness (www.mn.lori.org): 732.949.6660 or 619-524-3834.  [place] Association for Children's Mental Health (www.macmh.org): 306.406.3158.  Alcoholics Anonymous (www.alcoholics-anonymous.org): Check  "your phone book for your local chapter.  Suicide Awareness Voices of Education (SAVE) (www.save.org): 059-637-LAZX (7747)  National Suicide Prevention Line (www.mentalhealthmn.org): 019-155-POFZ (4240)  Mental Health Consumer/Survivor Network of [place] (www.mhcsn.net): 165.873.3836 or 874-819-6564  Mental Health Association of [place] (www.mentalhealth.org): 585.232.4249 or 175-388-4905  Self- Management and Recovery Training., SMART-- Toll free: 426.416.6496  [place]  Text 4 Life: txt \"LIFE\" to 68613 for immediate support and crisis intervention  Crisis text line: Text \"[place]\" to 628953. Free, confidential, 24/7.  Crisis Intervention: 262.682.8314 or 777-274-7185. Call anytime for help.     General Medication Instructions:   See your medication sheet(s) for instructions.   Take all medicines as directed.  Make no changes unless your doctor suggests them.   Go to all your doctor visits.  Be sure to have all your required lab tests. This way, your medicines can be refilled on time.  Do not use any drugs not prescribed by your doctor.  Avoid alcohol.    Advance Directives:   Scanned document on file with [place]? No scanned doc  Is document scanned? Pt states no documents  Honoring Choices Your Rights Handout: Informed and given  Was more information offered? Pt declined    The Treatment team has appreciated the opportunity to work with you. If you have any questions or concerns about your recent admission, you can contact the unit which can receive your call 24 hours a day, 7 days a week. They will be able to get in touch with a Provider if needed. The unit number is [phone] .    [place] Area:  [facility], [facility] stabilization hospitals- [phone]  [facility] Crisis Line: 1-465.688.1257  [facility]ates For Family [facility]: 148-3871  Sexual Assault Program [facility]: 964.416.2809 or 1-990.474.9806  [place] [facility]: 6-360-221-1678 Ext: 279       Calls answered Mon-Fri-8:00 am--4:30 " "pm    [place]:  [facility] Family Peace: 7-978-310-6407  [facility] - 2-304-154-7579  [facility] first call for help: 2-204-725-8643  [facility] Crisis Center:  (665) 737-1495    [place] Area:  Warm Line: 1-511.825.1588       Calls answered Tuesday--Saturday 4:00 pm--10:00 pm  [facility] Crisis Line - [phone]  [facility] 913-935-4163    [place] Statewide:  [place] Crisis and Referral Services: 3-849-007-4959  National Suicide Prevention Lifeline: 5-871-285-TALK (3638)   - ydo6ktfc- Text \"Life\" to [address]  First Call for Help: 2-1-1  [facility] Helpline- 2-596-ICVZ-HELP   Crisis Text Line: Text  [place]  to [address]990   "

## 2024-11-01 NOTE — H&P
[facility]    History and Physical  Medical Services       Date of Admission:  11/1/2024  Date of Service (when I saw the patient): 11/01/24    Assessment & Plan     Principal Problem:    Suicidal ideation    Active Medical Problems:  Seborrheic dermatitis- dry, itchy scalp. No open areas.  Pt encouraged to shower. Selsun shampoo ordered. Nursing to continue to monitor.     Infection to skin of neck- Per chart review. pt was seen in the ED on 10/30 for concerns of an abscess to the left side of his neck just below left ear lobe. It is suspected he was picking because he believed he had worms in his neck. At this time wound was open with pus, swelling, and redness. He was started on Augmentin. He reports he had 3 doses. Wound today is dry, scabbing over, no redness, no edema noted. He reports some pain but mostly when he opens his mouth wide because it is pulling the skin. Continue Augmentin for 17 more doses, neosporin to the area for 3 days.     Poor dentition- several decayed, broken teeth noted. Reports occasionally generalized teeth pain, not one particular tooth hurting. He reports he needs to get his teeth pulled. No abscess noted. No redness or swelling noted. He is already on Augmentin for a skin infection so this will cover him for any tooth abscess. Nursing to continue to monitor. Magic mouthwash, Tylenol as needed.     Pt medically stable, no acute medical concerns. Chronic medical problems stable. Will sign off. Please consult for any new medical issues or concerns.      Code Status: Full Code    Julieta Sommer CNP    Primary Care Physician   Joel Mendoza    Chief Complaint   Psych evaluation     History is obtained from the patient and electronic health record    History of Present Illness   (Per ED) Yovanny Bonner is a 24 year old male who is here the fourth time in the past 36 hours. I saw him here early yesterday morning for an open wound by the left ear. We started him on Augmentin,  which he has been taking. He was back early this morning and again this afternoon. [facility] and tells staff that when it gets cold out he feels worse. He has a sore throat.He has ADHD, auditory hallucinations, autism, history of SI and HI.  He is homeless. We did call the local homeless shelter and he is known to them. He did get a disorderly conduct charge this morning from fighting with the police near the [employer].     Past Medical History    I have reviewed this patient's medical history and updated it with pertinent information if needed.   Past Medical History:   Diagnosis Date    Anxiety disorder     No Comments Provided    Autistic disorder     No Comments Provided    Pervasive developmental disorder     and Spectrum disorder diagnosed by psychiatry in [place].       Past Surgical History   I have reviewed this patient's surgical history and updated it with pertinent information if needed.  Past Surgical History:   Procedure Laterality Date    DENTAL SURGERY      No Comments Provided       Prior to Admission Medications   Prior to Admission Medications   Prescriptions Last Dose Informant Patient Reported? Taking?   amoxicillin-clavulanate (AUGMENTIN) 875-125 MG tablet   No No   Sig: Take 1 tablet by mouth 2 times daily.      Facility-Administered Medications: None     Allergies   Allergies   Allergen Reactions    Banana Itching    Kiwi Extract Itching    Seasonal Allergies     Zoloft [Sertraline] Hives       Social History   I have reviewed this patient's social history and updated it with pertinent information if needed. Yovanny Bonner  reports that he has been smoking cigarettes. He has never been exposed to tobacco smoke. He has never used smokeless tobacco. He reports current drug use. Drug: Marijuana. He reports that he does not drink alcohol.    Family History   I have reviewed this patient's family history and updated it with pertinent information if needed.   Family History    Problem Relation Age of Onset    Unknown/Adopted Father     No Known Problems Sister     No Known Problems Sister        Review of Systems   CONSTITUTIONAL:  negative  EYES:  negative  HEENT:  negative except occasional teeth pain   RESPIRATORY:  negative  CARDIOVASCULAR:  negative  GASTROINTESTINAL:  negative  GENITOURINARY:  negative  INTEGUMENT/BREAST:  negative except neck wound   HEMATOLOGIC/LYMPHATIC:  negative  ALLERGIC/IMMUNOLOGIC:  negative  ENDOCRINE:  negative  MUSCULOSKELETAL:  negative  NEUROLOGICAL:  negative    Physical Exam   Temp: 97.7  F (36.5  C) Temp src: Temporal BP: 118/75 Pulse: 87   Resp: 16 SpO2: 98 % O2 Device: None (Room air)    Vital Signs with Ranges  Temp:  [97.2  F (36.2  C)-97.7  F (36.5  C)] 97.7  F (36.5  C)  Pulse:  [85-94] 87  Resp:  [16] 16  BP: (118-147)/(75-77) 118/75  SpO2:  [95 %-98 %] 98 %  0 lbs 0 oz    Constitutional: awake, alert, cooperative, no apparent distress, and appears stated age, disheveled, vitals stable   Eyes: Lids and lashes normal, pupils equal, round and reactive to light, extra ocular muscles intact, sclera clear, conjunctiva normal  ENT: Normocephalic, without obvious abnormality, atraumatic, external ears without lesions, oral pharynx with moist mucous membranes, no erythema or exudates, gums normal and poor dentition.  Hematologic / Lymphatic: no cervical lymphadenopathy  Respiratory: No increased work of breathing, good air exchange, clear to auscultation bilaterally, no crackles or wheezing  Cardiovascular: Normal apical impulse, regular rate and rhythm, normal S1 and S2, no S3 or S4, and no murmur noted  GI: normal bowel sounds, soft, non-distended, non-tender, no masses palpated, no hepatosplenomegally  Genitounirinary: deferred  Skin: normal skin color, texture, turgor and no redness, warmth, or swelling  Musculoskeletal: There is no redness, warmth, or swelling of the joints.  Full range of motion noted.   Neurologic: Awake, alert, oriented to  name, place and time.  Cranial nerves II-XII are grossly intact.    Neuropsychiatric: General: blunted,  calm, and fair eye contact    Data   Data reviewed today:   Recent Labs   Lab 10/31/24  0518   WBC 8.4   HGB 13.8   MCV 89         POTASSIUM 3.8   CHLORIDE 101   CO2 23   BUN 11.2   CR 0.67   ANIONGAP 12   BEATRICE 9.8   GLC 97       No results found for this or any previous visit (from the past 24 hours).

## 2024-11-01 NOTE — ED NOTES
[facility] called and notified staff member that they will not be available for transport until sometime this evening 11/1/24.

## 2024-11-01 NOTE — ED NOTES
Called [facility] to see if transportation is available to [facility].   They will call us back.   Felicia Oropeza RN on 11/1/2024 at 1:25 AM    Called [facility].   One  is not available at this time for transport.   They are calling the 2nd  now and will call us back.  Felicia Oropeza RN on 11/1/2024 at 2:55 AM    [facility] called and stated that they will not be able to transport pt at this time  Felicia Oropeza RN     [facility] and requested [facility] transport.   Transport may not be available this this morning.     Called [facility] back and requested that if a  does become available, to please give us a call back  Felicia Oropeza RN on 11/1/2024 at 3:36 AM      EMS transport in approximately 3 hours  Felicia Oropeza RN on 11/1/2024 at 4:03 AM

## 2024-11-01 NOTE — TELEPHONE ENCOUNTER
S: Outside Facility Felicia Sanchez GREGORY, RN  calling at [phone].  24 year old/Female presenting with psychosis. Pt thinks that there are bot flies underneath his skin.     B: Pt arrived via police. Pt presents with psychosis. Thinking there are bugs underneath his skin.  Pt affect in ED: ED notes did not provide much collateral. States pt is uncooperative with care stating he does not want an evaluation and is upset PD brought him in. Refusing labs.   Pt Dx: Anxiety Disorder, ADHD, Autism Spectrum Disorder, and aggressive behavior, suicidal idations, homicidal ideations perasive developmental disorder  Previous [facility] hx? No- unknown  Pt denies SI. Pt denies SIB.   Pt denies HI. Pt endorses hallucinations.   Hx of suicide attempt? No  Hx of aggression, or current concerns for aggression this visit? Yes- pt fought with police earlier in the day and was charged with disorderly conduct  Pt is not prescribed medication.   Pt denies OP services.  CD concerns: Yes- Pt refusing UA, but RN thinks pt may be using meth  Acute medical concerns: No  Does Pt present with any of the following: assistive devices, insulin pump, J/G tube, catheter, CPAP, continuous IV, continuous O2, bariatric needs, ADA needs? No  Is Pt their own guardian? Yes  Pt is ambulatory  Pt is  able to perform ADLs independently    A: Pt meets criteria for review for [facility] admission. Patient is voluntary. [facility] will place on 72 hour hold if pt refuses to be transferred to [facility]  COVID: Negative  Utox: Ordered, not yet collected  CMP: WNL  CBC: WNL  HCG: N/A    R: Patient accepted for behavioral bed placement: Yes        Accepted by Provider Radha Gayle    Admission to Inpatient Level of Care is indicated due to:     Patient risk of severity of behavioral health disorder is appropriate to proposed level of care as indicated by:   Imminent risk of harm to self Yes describe: pt delusional thinking bugs are underneath his skin  Imminent risk of harm to others No  describe:   And/or  Behavioral health disorder is present and appropriate for inpatient care with both of the following:   a.  Severe psychiatric, behavioral or other comorbid conditions: Yes describe:   b.  Severe dysfunction in daily living is present: Yes describe: Pt homeless  2.  Inpatient mental health services are necessary to meet patient needs based on:   a. Specific condition related to admission diagnosis is present and will likely improve with treatment at an inpatient level of care: Yes  b. Specific condition related to admission diagnosis will likely deteriorate in the absence of treatment at an inpatient level of care: Yes    3.  Situation and expectations are appropriate for inpatient care, as indicated by  one of the following:     A. Patient is unwilling to participate in treatment voluntarily and requires treatment. No   B. Is voluntary treatment at lower level of care feasible No  C. Around the clock medical and nursing care is for symptoms is required: Yes  D. Patient management at lower level of care is not appropriate and  biopsychosocial stressors may be contributing to clinical presentation. Yes

## 2024-11-01 NOTE — MEDICATION SCRIBE - ADMISSION MEDICATION HISTORY
"Medication Scribe Admission Medication History    Admission medication history is complete. The information provided in this note is only as accurate as the sources available at the time of the update.    Information Source(s): Patient, Prescription bottles, and CareEverywhere/SureScripts via in-person    Pertinent Information:   Patient reports he manages his own medications. Patient was very tired during interview and kept falling asleep. Patient reports the only prescription medication he is currently taking is the Augmentin. Patient denied any OTC vitamins/supplements. Writer asked about OTC pain analgesics. Patient reported he does take OTC analgesics but did not specify what he takes, stating he \"takes whatever works.\"  Augmentin: Patient reported he had taken 3 doses total PTA, stating his last dose was sometime yesterday but could not recall the time of day or how many doses he took (1 or 2). Patient then reported he had a dose this morning but did not give any more information. Patient arrived at [facility] ED last night (10/31/24) at 2127 and was discharged and transferred to our facility this morning (11/1/24) at 0919. There were no documented administrations of the Augmentin found in the encounter from the ED. Patient brought the bottle of Augmentin with him and writer reviewed the bottle and counted the number of tablets remaining. Although the patient reported he only took 3 doses total PTA, there were only 9 tablets remaining in the bottle, and one of those tablets was broken in half. This was dispensed via the [employer] machine on 10/30/24 #20 tabs/10 days. The remaining tablets are covered with dirt, and it is possible that the medication was dropped on the ground, although patient denied.     Changes made to PTA medication list:  Added: None  Deleted: None  Changed: None    Allergies reviewed with patient and updates made in EHR: yes    Medication History Completed By: Viry Landa 11/1/2024 12:12 " PM    PTA Med List   Medication Sig Note Last Dose/Taking    amoxicillin-clavulanate (AUGMENTIN) 875-125 MG tablet Take 1 tablet by mouth 2 times daily. 11/1/2024: Reports he took 3 doses total PTA. 10/31/2024

## 2024-11-01 NOTE — ED PROVIDER NOTES
Transfer of care from previous shift provider:. Schizophrenic presentation with no formal diagnosis. 4th ED presentation past 2 days. Accepted at [facility] awaiting transport.        Assessment and Plan:  Final diagnoses:   Infection of skin of neck   Autism spectrum disorder   MARIBEL (generalized anxiety disorder)   Auditory hallucinations      Disposition: [facility] behavioral health inpatient      Ronald Bailey MD  11/01/24 7864

## 2024-11-02 PROCEDURE — 99232 SBSQ HOSP IP/OBS MODERATE 35: CPT | Performed by: NURSE PRACTITIONER

## 2024-11-02 PROCEDURE — 250N000013 HC RX MED GY IP 250 OP 250 PS 637: Performed by: NURSE PRACTITIONER

## 2024-11-02 PROCEDURE — 124N000001 HC R&B MH

## 2024-11-02 RX ADMIN — AMOXICILLIN AND CLAVULANATE POTASSIUM 1 TABLET: 875; 125 TABLET, FILM COATED ORAL at 08:44

## 2024-11-02 RX ADMIN — AMOXICILLIN AND CLAVULANATE POTASSIUM 1 TABLET: 875; 125 TABLET, FILM COATED ORAL at 19:57

## 2024-11-02 RX ADMIN — BACITRACIN ZINC, NEOMYCIN, POLYMYXIN B 1 G: 400; 3.5; 5 OINTMENT TOPICAL at 19:56

## 2024-11-02 RX ADMIN — BACITRACIN ZINC, NEOMYCIN, POLYMYXIN B 1 G: 400; 3.5; 5 OINTMENT TOPICAL at 08:44

## 2024-11-02 RX ADMIN — SELENIUM SULFIDE 10 ML: 10 SHAMPOO TOPICAL at 21:00

## 2024-11-02 ASSESSMENT — ACTIVITIES OF DAILY LIVING (ADL)
ADLS_ACUITY_SCORE: 0
LAUNDRY: UNABLE TO COMPLETE
ORAL_HYGIENE: PROMPTS;INDEPENDENT
ADLS_ACUITY_SCORE: 0
DRESS: SCRUBS (BEHAVIORAL HEALTH);INDEPENDENT
ADLS_ACUITY_SCORE: 0
LAUNDRY: UNABLE TO COMPLETE
ADLS_ACUITY_SCORE: 0
ORAL_HYGIENE: INDEPENDENT;PROMPTS
ADLS_ACUITY_SCORE: 0
HYGIENE/GROOMING: INDEPENDENT;PROMPTS
ADLS_ACUITY_SCORE: 0
ADLS_ACUITY_SCORE: 0
DRESS: SCRUBS (BEHAVIORAL HEALTH);INDEPENDENT
ADLS_ACUITY_SCORE: 0
HYGIENE/GROOMING: INDEPENDENT;PROMPTS
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
## 2024-11-02 NOTE — PLAN OF CARE
Problem: Adult Behavioral Health Plan of Care  Goal: Patient-Specific Goal (Individualization)  Description: Patient will sleep 6 to 8 hours per night  Patient will eat at least 50% of meals  Patient will attend at least 50% of groups  Patient will comply with recommendations of treatment team  Patient will remain medication compliant  11/1/24: Room in MHICU due to possibility of unpredictable behaviors. No wean at this time. Treatment team to reassess daily.   Outcome: Progressing     Problem: Thought Process Alteration  Goal: Optimal Thought Clarity  Description: Pt will have a reduction in tactile hallucinations and have intact skin integrity by dishcharge  Outcome: Progressing    Face to face shift report received from Kadeem.     Patient appeared to be sleeping for approximately 6.5 hours since 2330.    Face to face report will be communicated to oncoming RN.    Keyonna Baig RN  11/2/2024  6:09 AM
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
## 2024-11-02 NOTE — PLAN OF CARE
Face to face shift report received from Cierra COON. Rounding completed, pt observed resting in bed at start of shift.    Problem: Adult Behavioral Health Plan of Care  Goal: Patient-Specific Goal (Individualization)  Description: Patient will sleep 6 to 8 hours per night  Patient will eat at least 50% of meals  Patient will attend at least 50% of groups  Patient will comply with recommendations of treatment team  Patient will remain medication compliant  11/1/24: Room in MHICU due to possibility of unpredictable behaviors. No wean at this time. Treatment team to reassess daily.   Outcome: Progressing     Problem: Thought Process Alteration  Goal: Optimal Thought Clarity  Description: Pt will have a reduction in tactile hallucinations and have intact skin integrity by dishcharge  Outcome: Progressing   Goal Outcome Evaluation:          Pt. Denied having any physical pain this shift. They also denied having any anxiety, SI, or intent to self-harm. Pt. Spent most of the morning resting and napping in bed. Pt. Would wake up for their medications and meals and then go back to sleep. Pt. Was slightly irritable at breakfast. They didn't eat their breakfast for about 45 minutes and then complained that it was cold. A new one was ordered for them. 100% was eaten at this meal. They also ate 100% at lunch. Pt. Was encouraged to shower this shift. This was declined by them.     Face to face report will be communicated to oncoming RN.    Mera Salas RN  11/2/2024  1:15 PM

## 2024-11-03 PROCEDURE — 87205 SMEAR GRAM STAIN: CPT | Performed by: NURSE PRACTITIONER

## 2024-11-03 PROCEDURE — 250N000013 HC RX MED GY IP 250 OP 250 PS 637: Performed by: NURSE PRACTITIONER

## 2024-11-03 PROCEDURE — 124N000001 HC R&B MH

## 2024-11-03 PROCEDURE — 99232 SBSQ HOSP IP/OBS MODERATE 35: CPT | Performed by: NURSE PRACTITIONER

## 2024-11-03 PROCEDURE — 87070 CULTURE OTHR SPECIMN AEROBIC: CPT | Performed by: NURSE PRACTITIONER

## 2024-11-03 RX ORDER — CLOTRIMAZOLE 1 %
CREAM (GRAM) TOPICAL 2 TIMES DAILY
Status: DISCONTINUED | OUTPATIENT
Start: 2024-11-03 | End: 2024-11-04 | Stop reason: HOSPADM

## 2024-11-03 RX ADMIN — NICOTINE POLACRILEX 2 MG: 2 GUM, CHEWING ORAL at 17:25

## 2024-11-03 RX ADMIN — HYDROXYZINE HYDROCHLORIDE 25 MG: 25 TABLET, FILM COATED ORAL at 20:57

## 2024-11-03 RX ADMIN — AMOXICILLIN AND CLAVULANATE POTASSIUM 1 TABLET: 875; 125 TABLET, FILM COATED ORAL at 08:48

## 2024-11-03 RX ADMIN — BACITRACIN ZINC, NEOMYCIN, POLYMYXIN B 1 G: 400; 3.5; 5 OINTMENT TOPICAL at 08:48

## 2024-11-03 RX ADMIN — HYDROXYZINE HYDROCHLORIDE 25 MG: 25 TABLET, FILM COATED ORAL at 08:48

## 2024-11-03 RX ADMIN — NICOTINE POLACRILEX 2 MG: 2 GUM, CHEWING ORAL at 15:51

## 2024-11-03 RX ADMIN — CLOTRIMAZOLE 1 G: 10 CREAM TOPICAL at 20:54

## 2024-11-03 RX ADMIN — ACETAMINOPHEN 650 MG: 325 TABLET, FILM COATED ORAL at 20:54

## 2024-11-03 RX ADMIN — CLOTRIMAZOLE: 10 CREAM TOPICAL at 12:29

## 2024-11-03 RX ADMIN — ACETAMINOPHEN 650 MG: 325 TABLET, FILM COATED ORAL at 03:47

## 2024-11-03 RX ADMIN — NICOTINE POLACRILEX 2 MG: 2 GUM, CHEWING ORAL at 12:50

## 2024-11-03 RX ADMIN — AMOXICILLIN AND CLAVULANATE POTASSIUM 1 TABLET: 875; 125 TABLET, FILM COATED ORAL at 20:53

## 2024-11-03 RX ADMIN — NICOTINE POLACRILEX 2 MG: 2 GUM, CHEWING ORAL at 11:13

## 2024-11-03 RX ADMIN — BACITRACIN ZINC, NEOMYCIN, POLYMYXIN B 1 G: 400; 3.5; 5 OINTMENT TOPICAL at 20:54

## 2024-11-03 ASSESSMENT — ACTIVITIES OF DAILY LIVING (ADL)
ADLS_ACUITY_SCORE: 0
LAUNDRY: UNABLE TO COMPLETE
ADLS_ACUITY_SCORE: 0
ORAL_HYGIENE: PROMPTS;INDEPENDENT
ADLS_ACUITY_SCORE: 0
HYGIENE/GROOMING: INDEPENDENT;PROMPTS
ADLS_ACUITY_SCORE: 0
DRESS: SCRUBS (BEHAVIORAL HEALTH);INDEPENDENT
ADLS_ACUITY_SCORE: 0
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
## 2024-11-03 NOTE — PLAN OF CARE
Problem: Adult Behavioral Health Plan of Care  Goal: Patient-Specific Goal (Individualization)  Description: Patient will sleep 6 to 8 hours per night  Patient will eat at least 50% of meals  Patient will attend at least 50% of groups  Patient will comply with recommendations of treatment team  Patient will remain medication compliant  11/2/24: Room in MHICU due to possibility of unpredictable behaviors. Pt may wean to the open unit as long as his behavior remains appropraite. Treatment team to reassess daily.   Outcome: Progressing     Problem: Thought Process Alteration  Goal: Optimal Thought Clarity  Description: Pt will have a reduction in tactile hallucinations and have intact skin integrity by dishcharge  Outcome: Progressing   Face to face shift report received from Kadeem. Rounding completed, patient laying in bed, appeared to be sleeping, respirations noted.     Patient appeared to be sleeping for approximately 6.5 hours since 2330 last shift.    0347 - Patient received PRN Tylenol 650mg PO for his neck, on follow up patient appeared to be sleeping, no signs of distress noted.     Face to face report will be communicated to oncoming RN.    Keyonna Baig RN  11/3/2024  6:49 AM

## 2024-11-03 NOTE — PLAN OF CARE
Face to face shift report received from SHAGGY Newby. Rounding completed, pt observed resting in bed at start of shift.    Problem: Adult Behavioral Health Plan of Care  Goal: Patient-Specific Goal (Individualization)  Description: Patient will sleep 6 to 8 hours per night  Patient will eat at least 50% of meals  Patient will attend at least 50% of groups  Patient will comply with recommendations of treatment team  Patient will remain medication compliant  11/2/24: Room in MHICU due to possibility of unpredictable behaviors. Pt may wean to the open unit as long as his behavior remains appropraite. Treatment team to reassess daily.   Outcome: Progressing     Problem: Thought Process Alteration  Goal: Optimal Thought Clarity  Description: Pt will have a reduction in tactile hallucinations and have intact skin integrity by dishcharge  Outcome: Progressing   Goal Outcome Evaluation:    Plan of Care Reviewed With: patient           Pt. Denied having any physical pan this shift. They did have some anxiety and itching in their neck. PRN hydroxyzine 25 mg was given for this at 0848. Pt. Took a shower this shift. They weaned to the open unit this shift starting 0853. Behaviors during this time were appropriate. 100% was eaten at breakfast and at lunch. No groups were attended this shift. After lunch, Pt. Went and laid down for a bit in their room. The wound on patient's left ear was swabbed for a culture this shift. Results for this had to be sent out and are still pending. Pt. Was prescribed Lotrimin 1 % cream for their right neck area. This was started this shift and tolerated well by Pt      Face to face report will be communicated to oncoming RN.    Mera Salas RN  11/3/2024  1:33 PM

## 2024-11-03 NOTE — PLAN OF CARE
Problem: Thought Process Alteration  Goal: Optimal Thought Clarity  Description: Pt will have a reduction in tactile hallucinations and have intact skin integrity by dishcharge  Outcome: Progressing    Pt was more logical and denied having bugs in his neck     Problem: Adult Behavioral Health Plan of Care  Goal: Patient-Specific Goal (Individualization)  Description: Patient will sleep 6 to 8 hours per night  Patient will eat at least 50% of meals  Patient will attend at least 50% of groups  Patient will comply with recommendations of treatment team  Patient will remain medication compliant  11/2/24: Room in MHICU due to possibility of unpredictable behaviors. Pt may wean as long as his behavior is appropriate. Treatment team to reassess daily.   Outcome: Progressing   Goal Outcome Evaluation:    Plan of Care Reviewed With: patient      1530 Face to face rounding complete.  Pt introduced to nursing for the shift.     Pt was withdrawn to his bed most of the shift and did not want to answer questions.  Later in the shift he asked about when he could leave the hospital.  I explained to him that his best bet is to talk to his provider in there morning Sunday.  He told me that he is homesick for his tent. He denied having bugs in his neck.  Pt did shower this evening and was allowed to wean to the open unit for about an hour late in the shift.  PT was polite and respectful in his interactions with peers and staff when he was weaning.     2300 Face to face end of shift report communicated to .      Kadeem Mejias RN  11/2/2024

## 2024-11-04 VITALS
TEMPERATURE: 97.3 F | SYSTOLIC BLOOD PRESSURE: 124 MMHG | HEART RATE: 77 BPM | OXYGEN SATURATION: 98 % | DIASTOLIC BLOOD PRESSURE: 68 MMHG | RESPIRATION RATE: 18 BRPM

## 2024-11-04 PROCEDURE — 250N000013 HC RX MED GY IP 250 OP 250 PS 637: Performed by: NURSE PRACTITIONER

## 2024-11-04 PROCEDURE — 99239 HOSP IP/OBS DSCHRG MGMT >30: CPT | Performed by: NURSE PRACTITIONER

## 2024-11-04 RX ORDER — CLOTRIMAZOLE 1 %
CREAM (GRAM) TOPICAL 2 TIMES DAILY
COMMUNITY
Start: 2024-11-04

## 2024-11-04 RX ADMIN — HYDROXYZINE HYDROCHLORIDE 25 MG: 25 TABLET, FILM COATED ORAL at 08:13

## 2024-11-04 RX ADMIN — AMOXICILLIN AND CLAVULANATE POTASSIUM 1 TABLET: 875; 125 TABLET, FILM COATED ORAL at 08:13

## 2024-11-04 RX ADMIN — NICOTINE POLACRILEX 2 MG: 2 GUM, CHEWING ORAL at 13:01

## 2024-11-04 RX ADMIN — CLOTRIMAZOLE: 10 CREAM TOPICAL at 08:13

## 2024-11-04 ASSESSMENT — ACTIVITIES OF DAILY LIVING (ADL)
DRESS: SCRUBS (BEHAVIORAL HEALTH);INDEPENDENT
ORAL_HYGIENE: INDEPENDENT
ADLS_ACUITY_SCORE: 0
HYGIENE/GROOMING: INDEPENDENT
ADLS_ACUITY_SCORE: 0
LAUNDRY: UNABLE TO COMPLETE
ADLS_ACUITY_SCORE: 0
ADLS_ACUITY_SCORE: 0

## 2024-11-04 NOTE — PLAN OF CARE
Problem: Thought Process Alteration  Goal: Optimal Thought Clarity  Description: Pt will have a reduction in tactile hallucinations and have intact skin integrity by dishcharge  Outcome: Progressing     Problem: Adult Behavioral Health Plan of Care  Goal: Patient-Specific Goal (Individualization)  Description: Patient will sleep 6 to 8 hours per night  Patient will eat at least 50% of meals  Patient will attend at least 50% of groups  Patient will comply with recommendations of treatment team  Patient will remain medication compliant  11/2/24: Room in MHICU due to possibility of unpredictable behaviors. Pt may wean to the open unit as long as his behavior remains appropraite. Treatment team to reassess daily.   Outcome: Progressing     Patient slept through the night with regular respirations and position changes noted.  No concerns at this time.  Face to face end of shift report communicated to day shift RN.     Breanne Ambrosio RN  11/4/2024  6:43 AM

## 2024-11-04 NOTE — PLAN OF CARE
Problem: Thought Process Alteration  Goal: Optimal Thought Clarity  Description: Pt will have a reduction in tactile hallucinations and have intact skin integrity by discharge  Outcome: Progressing    Pt denies having bugs in his neck     Problem: Adult Behavioral Health Plan of Care  Goal: Patient-Specific Goal (Individualization)  Description: Patient will sleep 6 to 8 hours per night  Patient will eat at least 50% of meals  Patient will attend at least 50% of groups  Patient will comply with recommendations of treatment team  Patient will remain medication compliant  11/2/24: Room in MHICU due to possibility of unpredictable behaviors. Pt may wean to the open unit as long as his behavior remains appropriate. Treatment team to reassess daily.   Outcome: Progressing   Goal Outcome Evaluation:     1530 Face to face rounding complete.  Pt introduced to nursing for the shift.    Pt was up and active most of the shift weaning to the dayroom watching TV with peers and did attend part of the group. He told me that he hopes that he will be ready to go soon but wants to get his neck wounds healed more.  He denied having hallucinations of bugs tonight but did tell me that he has urges to touch his neck wound.  He thought that he had puss coming out of the wound on his left ear.  I assessed it and did not see puss coming out of the wound.  Pt was animated while watching the football  this evening.    2300 Face to face end of shift report communicated to Night Shift RN's along with Pt's fall risk.     Kadeem Mejias RN  11/3/2024

## 2024-11-04 NOTE — PLAN OF CARE
Discharge Note    Patient Discharged to [facility] on 11/4/2024 1:30 PM via CRT accompanied by Security and staff.     Patient informed of discharge instructions in AVS. patient verbalizes understanding and denies having any questions pertaining to AVS. Patient stable at time of discharge. Patient denies SI, HI, and thoughts of self harm at time of discharge. All personal belongings returned to patient. Discharge prescriptions sent to with Pt. Still sealed in pharmacy bag. Home medications sent with Pt. Still sealed in security bag. AVS sent with Pt.    Mera Salas RN  11/4/2024  1:30 PM    Shift Summary:  Face to face shift report received from SHAGGY Raymundo. Rounding completed, pt observed resting in bed at start of shift.    Problem: Adult Behavioral Health Plan of Care  Goal: Patient-Specific Goal (Individualization)  Description: Patient will sleep 6 to 8 hours per night  Patient will eat at least 50% of meals  Patient will attend at least 50% of groups  Patient will comply with recommendations of treatment team  Patient will remain medication compliant  11/2/24: Room in MHICU due to possibility of unpredictable behaviors. Pt may wean to the open unit as long as his behavior remains appropraite. Treatment team to reassess daily.   Outcome: Progressing     Problem: Thought Process Alteration  Goal: Optimal Thought Clarity  Description: Pt will have a reduction in tactile hallucinations and have intact skin integrity by dishcharge  Outcome: Progressing   Goal Outcome Evaluation:    Plan of Care Reviewed With: patient        Pt. Denied having any physical pain this shift. They did have some mild anxiety and itchiness in their neck. PRN hydroxyzine 25 mg were given for this at 0813. Pt. Denied having any HI, SI, or intent to self-harm. 0% was eaten at breakfast. Pt. Was slightly irritable and stated that they did not want to eat their pancakes with a paper spoon. Pt. Was offered to wean to open unit where they  could use plastic silverware. Pt. Declined this. They ended up weaning to open unit after lunch. Behaviors during this time were appropriate. Pt. Spent most of this time out in the lounge. No groups were attended this shift.    Pt. Is discharging this shift via CRT to their parents house. Their mother called this shift and stated that she did not like this idea. SW notified of the call and Mother's wish. Pt. Discharged from unit with CRT at 1330.     Face to face report will be communicated to oncoming RN.    Mera Salas RN  11/4/2024  1:47 PM

## 2024-11-04 NOTE — PROGRESS NOTES
Pt is discharging at the recommendation of the treatment team. Pt is discharging to Home transported by CRT. Pt denies having any thoughts of hurting themself or anyone else. Pt denies anxiety or depression. Pt requested to schedule his own out patient appointments. SW provided the patient with local resources and contacts Discharge instructions, including; demographic sheet, psychiatric evaluation, discharge summary, and AVS were faxed to these next level of care providers.

## 2024-11-04 NOTE — PROGRESS NOTES
SW spoke the patient. Patient stated he would like a ride to his parents.     SW called CRT. CRT stated they will call SW back and let them know if they can pick the patient up today.    CRT called SW back. They will pick the patient between 1:30 pm and 2:00 pm for discharge and then they will bring the patient to his belongings and then transport him to his parents in [place]. SW updated nursing.

## 2024-11-05 NOTE — DISCHARGE SUMMARY
[facility] PSYCHIATRY  DISCHARGE SUMMARY     DISCHARGE DATA     Yovanny Bonner MRN# [id number]   Age: 24 year old YOB: 2000     Date of Admission: 11/1/2024  Date of Discharge: November 4, 2024  Discharge Provider: Bella Buckner NP       REASON FOR ADMISSION     Per ED: Yovanny Bonner is a 24 year old male who is here the fourth time in the past 36 hours. I saw him here early yesterday morning for an open wound by the left ear. We started him on Augmentin, which he has been taking. He was back early this morning and again this afternoon. He comes in tonight and tells staff that when it gets cold out he feels worse. He has a sore throat.     He has ADHD, auditory hallucinations, autism, history of SI and HI.  He is homeless. We did call the local homeless shelter and he is known to them. He did get a disorderly conduct charge this morning from fighting with the police near the [place].     Patient was continued on antibiotic for skin infection. Regarding treatment, will currently plan on using standard unit PRN orders as we get to know what has been going on with him in the next 24 - 48 hours.      DISCHARGE DIAGNOSES     #. Unspecified psychosis  #. R/o substance use disorder  #. Pervasive developmental disorder       CONSULTS     None       HOSPITAL COURSE     Legal status: Voluntary    Patient was admitted to unit 5 due to the aforementioned presentation. The patient was placed under 15 minute checks to ensure patient safety. The patient participated in unit programming and groups as able.    Mr. Bonner did not require seclusion/restraint during hospitalization.     We reviewed with Mr. Bonner current and past medication trials including duration, dose, response and side effects. During this hospitalization, the following changes to the patient's psychotropic medications were made:    PTA psychotropic medications stopped:     - none    PTA psychotropic medications continued/changed:      - none    New psychotropic medications tried and stopped:     - none    New psychotropic medications initiated:     - Augmentin 875-125 mg BID x 10 days- will have 13 doses left at discharge    Patient not taking any PTA medications upon admission besides antibiotic for skin infection. This was continued while patient was hospitalized. Open area on ear did appear to be healing well with no signs or symptoms of infection. Patient was initially irritable and withdrawn to room upon admission. By day of discharge patient was more interactive with staff and peers. He denied belief that there were any bugs in his wounds and did not appear to be having any further symptoms of psychosis. Patient did not provide a urine drug screen this admission, so unable to definitively state whether this may have been a substance induced psychosis. Patient denied any suicidal thoughts and denied any hallucinations by time of discharge. He is a voluntary patient and would like to discharge today. CRT is able to give him a ride home to [facility]. Patient will be provided remaining doses of antibiotic at discharge and is instructed to follow-up with his outpatient PCP at discharge for ongoing care. Patient is discharging this afternoon to follow-up with outpatient services transported to [facility] by CRT.     With these changes and supports the patient noticed improvement in their symptoms and felt sufficiently ready for discharge. As a result, Yovanny Bonner was discharged to parents home. At the time of discharge, Yovanny Bonner was determined to not be a danger to self or others. The patient was also medically stable for discharge. At the current time of discharge, the patient does not meet criteria for involuntary hospitalization. On the day of discharge, the patient reports that they do not have suicidal or homicidal ideation. Steps taken to minimize risk include: assessing patient s behavior and thought process daily during  "hospital stay, discharging patient with adequate plan for follow up for mental and physical health and discussing safety plan of returning to the hospital should the patient ever have thoughts of harming themselves or others. Therefore, based on all available evidence including the factors cited above, the patient does not appear to be at imminent risk for self-harm, and is appropriate for outpatient level of care. However, if patient uses substances or is medication non-adherent, their risk of decompensation and SI will be elevated. This was discussed with the patient.       DISCHARGE MEDICATIONS     Discharge Medication List as of 11/4/2024  2:29 PM        START taking these medications    Details   clotrimazole (LOTRIMIN) 1 % external cream Apply topically 2 times daily.OTC           CONTINUE these medications which have CHANGED    Details   amoxicillin-clavulanate (AUGMENTIN) 875-125 MG tablet Take 1 tablet by mouth 2 times daily., Disp-13 tablet, R-0, E-Prescribe Received 7 doses in hospital           Reason for two or more neuroleptics: not applicable       MENTAL STATUS EXAM   Vitals: /68 (BP Location: Right arm)   Pulse 77   Temp 97.3  F (36.3  C) (Temporal)   Resp 18   SpO2 98%     Appearance: Alert, oriented, dressed in hospital scrubs, appears stated age   Attitude: Cooperative   Eye Contact: Fair  Mood: \"Better\"  Affect: euthymic  Speech: Normal rate and rhythm   Psychomotor Behavior: No tremor, rigidity, or psychomotor abnormality   Thought Process: Logical, goal directed   Associations: No loose associations   Thought Content: Denies SI or plan. No SIB. Denies A/V hallucinations. No evidence of delusional thought.  Insight: fair  Judgment: fair  Oriented to: Person, place, and time  Attention Span and Concentration: Intact  Recent and Remote Memory: Intact  Language: English with appropriate syntax and vocabulary  Fund of Knowledge: Average  Muscle Strength and Tone: Grossly normal  Gait and " Station: Grossly normal       DISCHARGE PLAN     1.  Education given regarding diagnostic and treatment options with risks, benefits and alternatives with adequate verbalization of understanding.  2.  Discharge to parents home. Upon detailed review of risk factors, patient amenable for release.   3.  Continue aforementioned medications and associated medication changes with follow-up by outpatient provider.  4.  Crisis management planning in place.    5.  Nursing and  to review further discharge recommendations.   6.  Patient is being discharged with the following appointments as detailed below.      Health Care Follow-up: please call and schedule PCP follow-up after discharge. Patient also provided resource list for psychiatry/therapy options.     Family Medicine - [facility]  [address] [facility] [address]   [place], [place] [address]  Phone Number: [phone]  Fax Number: [phone]       DISCHARGE SERVICES PROVIDED     40 minutes spent on discharge services, including:  Final examination of patient.  Review and discussion of hospital stay.  Instructions for continued outpatient care/goals.  Preparation of discharge records.  Preparation of medications refills and new prescriptions.  Preparation of applicable referral forms.        ATTESTATION     Bella Buckner NP       LABS THIS ADMISSION     CBC, CRP, BMP- WNL. Covid- negative. Wound culture- pending

## 2024-11-06 LAB
BACTERIA WND CULT: ABNORMAL
GRAM STAIN RESULT: ABNORMAL
GRAM STAIN RESULT: ABNORMAL

## 2024-12-05 ENCOUNTER — HOSPITAL ENCOUNTER (EMERGENCY)
Facility: OTHER | Age: 24
Discharge: HOME | End: 2024-12-05
Payer: COMMERCIAL

## 2024-12-05 VITALS
HEIGHT: 73 IN | OXYGEN SATURATION: 98 % | TEMPERATURE: 96.8 F | HEART RATE: 98 BPM | RESPIRATION RATE: 16 BRPM | DIASTOLIC BLOOD PRESSURE: 74 MMHG | BODY MASS INDEX: 21.2 KG/M2 | WEIGHT: 160 LBS | SYSTOLIC BLOOD PRESSURE: 119 MMHG

## 2024-12-05 DIAGNOSIS — M54.2 NECK PAIN ON RIGHT SIDE: ICD-10-CM

## 2024-12-05 DIAGNOSIS — F22 DELUSIONS OF PARASITOSIS (H): ICD-10-CM

## 2024-12-05 DIAGNOSIS — S10.91XA ABRASION OF NECK, INITIAL ENCOUNTER: ICD-10-CM

## 2024-12-05 LAB
ANION GAP SERPL CALCULATED.3IONS-SCNC: 10 MMOL/L (ref 7–15)
APAP SERPL-MCNC: <5 UG/ML (ref 10–30)
BASOPHILS # BLD AUTO: 0 10E3/UL (ref 0–0.2)
BASOPHILS NFR BLD AUTO: 1 %
BUN SERPL-MCNC: 8.8 MG/DL (ref 6–20)
CALCIUM SERPL-MCNC: 9.6 MG/DL (ref 8.8–10.4)
CHLORIDE SERPL-SCNC: 106 MMOL/L (ref 98–107)
CREAT SERPL-MCNC: 0.85 MG/DL (ref 0.67–1.17)
EGFRCR SERPLBLD CKD-EPI 2021: >90 ML/MIN/1.73M2
EOSINOPHIL # BLD AUTO: 0.1 10E3/UL (ref 0–0.7)
EOSINOPHIL NFR BLD AUTO: 2 %
ERYTHROCYTE [DISTWIDTH] IN BLOOD BY AUTOMATED COUNT: 12.6 % (ref 10–15)
ETHANOL SERPL-MCNC: <0.01 G/DL
GLUCOSE SERPL-MCNC: 116 MG/DL (ref 70–99)
HCO3 SERPL-SCNC: 27 MMOL/L (ref 22–29)
HCT VFR BLD AUTO: 41.3 % (ref 40–53)
HGB BLD-MCNC: 13.7 G/DL (ref 13.3–17.7)
HOLD SPECIMEN: NORMAL
IMM GRANULOCYTES # BLD: 0 10E3/UL
IMM GRANULOCYTES NFR BLD: 0 %
LYMPHOCYTES # BLD AUTO: 1.2 10E3/UL (ref 0.8–5.3)
LYMPHOCYTES NFR BLD AUTO: 20 %
MCH RBC QN AUTO: 29.8 PG (ref 26.5–33)
MCHC RBC AUTO-ENTMCNC: 33.2 G/DL (ref 31.5–36.5)
MCV RBC AUTO: 90 FL (ref 78–100)
MONOCYTES # BLD AUTO: 1.1 10E3/UL (ref 0–1.3)
MONOCYTES NFR BLD AUTO: 19 %
NEUTROPHILS # BLD AUTO: 3.7 10E3/UL (ref 1.6–8.3)
NEUTROPHILS NFR BLD AUTO: 60 %
NRBC # BLD AUTO: 0 10E3/UL
NRBC BLD AUTO-RTO: 0 /100
PLATELET # BLD AUTO: 333 10E3/UL (ref 150–450)
POTASSIUM SERPL-SCNC: 3.7 MMOL/L (ref 3.4–5.3)
RBC # BLD AUTO: 4.6 10E6/UL (ref 4.4–5.9)
SALICYLATES SERPL-MCNC: <0.3 MG/DL
SODIUM SERPL-SCNC: 143 MMOL/L (ref 135–145)
TSH SERPL DL<=0.005 MIU/L-ACNC: 0.76 UIU/ML (ref 0.3–4.2)
WBC # BLD AUTO: 6.2 10E3/UL (ref 4–11)

## 2024-12-05 PROCEDURE — 250N000013 HC RX MED GY IP 250 OP 250 PS 637

## 2024-12-05 PROCEDURE — 85048 AUTOMATED LEUKOCYTE COUNT: CPT

## 2024-12-05 PROCEDURE — 85004 AUTOMATED DIFF WBC COUNT: CPT

## 2024-12-05 PROCEDURE — 84443 ASSAY THYROID STIM HORMONE: CPT

## 2024-12-05 PROCEDURE — 80143 DRUG ASSAY ACETAMINOPHEN: CPT

## 2024-12-05 PROCEDURE — 80179 DRUG ASSAY SALICYLATE: CPT

## 2024-12-05 PROCEDURE — 80048 BASIC METABOLIC PNL TOTAL CA: CPT

## 2024-12-05 PROCEDURE — 82077 ASSAY SPEC XCP UR&BREATH IA: CPT

## 2024-12-05 PROCEDURE — 99284 EMERGENCY DEPT VISIT MOD MDM: CPT

## 2024-12-05 PROCEDURE — 36415 COLL VENOUS BLD VENIPUNCTURE: CPT

## 2024-12-05 PROCEDURE — 85041 AUTOMATED RBC COUNT: CPT

## 2024-12-05 PROCEDURE — 250N000009 HC RX 250

## 2024-12-05 PROCEDURE — 99283 EMERGENCY DEPT VISIT LOW MDM: CPT

## 2024-12-05 RX ORDER — GINSENG 100 MG
500 CAPSULE ORAL ONCE
Status: COMPLETED | OUTPATIENT
Start: 2024-12-05 | End: 2024-12-05

## 2024-12-05 RX ORDER — CLOTRIMAZOLE 1 %
CREAM (GRAM) TOPICAL 2 TIMES DAILY
Status: DISCONTINUED | OUTPATIENT
Start: 2024-12-05 | End: 2024-12-05

## 2024-12-05 RX ORDER — CLOTRIMAZOLE 1 %
CREAM (GRAM) TOPICAL 2 TIMES DAILY
Status: DISCONTINUED | OUTPATIENT
Start: 2024-12-05 | End: 2024-12-05 | Stop reason: HOSPADM

## 2024-12-05 RX ADMIN — CLOTRIMAZOLE: 1 CREAM TOPICAL at 15:21

## 2024-12-05 RX ADMIN — BACITRACIN 1 G: 500 OINTMENT TOPICAL at 11:50

## 2024-12-05 ASSESSMENT — ACTIVITIES OF DAILY LIVING (ADL)
ADLS_ACUITY_SCORE: 46

## 2024-12-05 ASSESSMENT — COLUMBIA-SUICIDE SEVERITY RATING SCALE - C-SSRS
1. IN THE PAST MONTH, HAVE YOU WISHED YOU WERE DEAD OR WISHED YOU COULD GO TO SLEEP AND NOT WAKE UP?: NO
6. HAVE YOU EVER DONE ANYTHING, STARTED TO DO ANYTHING, OR PREPARED TO DO ANYTHING TO END YOUR LIFE?: NO
2. HAVE YOU ACTUALLY HAD ANY THOUGHTS OF KILLING YOURSELF IN THE PAST MONTH?: NO

## 2024-12-05 ASSESSMENT — ENCOUNTER SYMPTOMS: WOUND: 1

## 2024-12-05 NOTE — ED TRIAGE NOTES
Pt comes in today with c/o R ear/neck pain. States he believes he has some tpe of worm or insect imbedded in his skin. States he has seen it and can feel it when pressing on his skin. Pt states he is not crazy this time and is sober. States he had a similar thing about one month ago.     Triage Assessment (Adult)       Row Name 12/05/24 1107          Triage Assessment    Airway WDL WDL        Respiratory WDL    Respiratory WDL WDL        Skin Circulation/Temperature WDL    Skin Circulation/Temperature WDL X        Cardiac WDL    Cardiac WDL WDL        Peripheral/Neurovascular WDL    Peripheral Neurovascular WDL WDL        Cognitive/Neuro/Behavioral WDL    Cognitive/Neuro/Behavioral WDL WDL

## 2024-12-05 NOTE — ED NOTES
Spoke with pt about lab work and urine drug screen. States he is willing to do these tests but does not have to urinate at this time. Pt also willing to speak with a mental health specialist. He is frustrated that we do not pull out the worm that has been under his skin. Understands medical staff does not see evidence of a worm or parasite being imbedding in his skin and will look to the blood work for evidence of infection. Food, water and coffee supplied.

## 2024-12-05 NOTE — ED PROVIDER NOTES
"  History     Chief Complaint   Patient presents with    Otalgia     HPI  Yovanny Bonner is a 24 year old male with a worm in the right side of his neck and behind his ear. On the walk to the hospital he placed a bunch of snow on his neck to try and freeze and flush the worms out of his neck. He reports he initially had one on the left side that he had to pull out. He believes the worm moved to the right side of his neck. There is abrasions noted to the right side of his neck. He has been squeezing his neck to try and get the \"pin worm\" out of his neck. He can hear the worm \"squeal\" when it comes out and goes back inside. He can feel the sensation under his skin. Hx similar episode one month ago. He was hospitalized at that time with schizophrenic presentation with no formal diagnosis. He did not  his prescribed medication after discharge from the [facility]. He denies any recent drug use. Admits to alcohol use last night. He is homeless. He reports staying with friends.     Allergies:  Allergies   Allergen Reactions    Banana Itching    Kiwi Extract Itching    Seasonal Allergies     Zoloft [Sertraline] Hives     Problem List:    Patient Active Problem List    Diagnosis Date Noted    Delusional disorder (H) 12/05/2024     Priority: Medium    Dental caries 01/24/2018     Priority: Medium    ADHD, hyperactive-impulsive type 10/02/2015     Priority: Medium    Aggressive behavior 08/05/2014     Priority: Medium    Anxiety state 08/05/2014     Priority: Medium     Formatting of this note might be different from the original. IMO Update      Auditory hallucinations 08/05/2014     Priority: Medium    Homicidal ideations 08/05/2014     Priority: Medium    Suicidal ideation 08/05/2014     Priority: Medium    Autism spectrum disorder 03/13/2014     Priority: Medium    MARIBEL (generalized anxiety disorder) 03/13/2014     Priority: Medium    ADHD 07/26/2011     Priority: Medium    Other specified pervasive " "developmental disorders, current or active state 07/26/2011     Priority: Medium      Past Medical History:    Past Medical History:   Diagnosis Date    Anxiety disorder     Autistic disorder     Pervasive developmental disorder      Past Surgical History:    Past Surgical History:   Procedure Laterality Date    DENTAL SURGERY      No Comments Provided     Family History:    Family History   Problem Relation Age of Onset    Unknown/Adopted Father     No Known Problems Sister     No Known Problems Sister        Social History:  Marital Status:  Single [1]  Social History     Tobacco Use    Smoking status: Every Day     Types: Cigarettes, Vaping Device     Passive exposure: Never    Smokeless tobacco: Never   Vaping Use    Vaping status: Every Day   Substance Use Topics    Alcohol use: Yes     Comment: 2 drinks last night    Drug use: Yes     Types: Marijuana      Medications:    amoxicillin-clavulanate (AUGMENTIN) 875-125 MG tablet  clotrimazole (LOTRIMIN) 1 % external cream      Review of Systems   Skin:  Positive for wound.   All other systems reviewed and are negative.      Physical Exam   BP: 119/74  Pulse: 98  Temp: 96.8  F (36  C)  Resp: 16  Height: 185.4 cm (6' 1\")  Weight: 72.6 kg (160 lb)  SpO2: 98 %    Physical Exam  Vitals and nursing note reviewed.   Constitutional:       General: He is not in acute distress.     Appearance: He is not toxic-appearing.   HENT:      Head: Normocephalic.      Right Ear: Tympanic membrane and external ear normal.      Left Ear: Tympanic membrane and external ear normal.      Nose: Nose normal.      Mouth/Throat:      Mouth: Mucous membranes are moist.   Cardiovascular:      Rate and Rhythm: Normal rate and regular rhythm.   Pulmonary:      Effort: Pulmonary effort is normal.      Breath sounds: Normal breath sounds.   Musculoskeletal:         General: Normal range of motion.   Skin:     General: Skin is warm and dry.      Comments: 3 superficial abrasions noted to right neck. " No drainage. No surrounding erythema. No palpable mass.    Neurological:      Mental Status: He is alert and oriented to person, place, and time.   Psychiatric:         Attention and Perception: He perceives auditory hallucinations.         Thought Content: Thought content is delusional. Thought content does not include homicidal or suicidal ideation.      Comments: Tactile and auditory hallucinations (squealing) sounds.             Results for orders placed or performed during the hospital encounter of 12/05/24 (from the past 24 hours)   CBC with platelets differential    Narrative    The following orders were created for panel order CBC with platelets differential.  Procedure                               Abnormality         Status                     ---------                               -----------         ------                     CBC with platelets and d...[[id number]]                      Final result                 Please view results for these tests on the individual orders.   Basic metabolic panel   Result Value Ref Range    Sodium 143 135 - 145 mmol/L    Potassium 3.7 3.4 - 5.3 mmol/L    Chloride 106 98 - 107 mmol/L    Carbon Dioxide (CO2) 27 22 - 29 mmol/L    Anion Gap 10 7 - 15 mmol/L    Urea Nitrogen 8.8 6.0 - 20.0 mg/dL    Creatinine 0.85 0.67 - 1.17 mg/dL    GFR Estimate >90 >60 mL/min/1.73m2    Calcium 9.6 8.8 - 10.4 mg/dL    Glucose 116 (H) 70 - 99 mg/dL   Ethanol GH   Result Value Ref Range    Alcohol ethyl <0.01 <=0.01 g/dL   Extra Tube    Narrative    The following orders were created for panel order Extra Tube.  Procedure                               Abnormality         Status                     ---------                               -----------         ------                     Extra Blue Top Tube[[id number]]                              Final result               Extra Red Top Tube[[id number]]                               Final result               Extra Green Top (Lithium...[[id number]]                       Final result                 Please view results for these tests on the individual orders.   Extra Blue Top Tube   Result Value Ref Range    Hold Specimen JIC    Extra Red Top Tube   Result Value Ref Range    Hold Specimen JIC    Extra Green Top (Lithium Heparin) ON ICE   Result Value Ref Range    Hold Specimen JIC    CBC with platelets and differential   Result Value Ref Range    WBC Count 6.2 4.0 - 11.0 10e3/uL    RBC Count 4.60 4.40 - 5.90 10e6/uL    Hemoglobin 13.7 13.3 - 17.7 g/dL    Hematocrit 41.3 40.0 - 53.0 %    MCV 90 78 - 100 fL    MCH 29.8 26.5 - 33.0 pg    MCHC 33.2 31.5 - 36.5 g/dL    RDW 12.6 10.0 - 15.0 %    Platelet Count 333 150 - 450 10e3/uL    % Neutrophils 60 %    % Lymphocytes 20 %    % Monocytes 19 %    % Eosinophils 2 %    % Basophils 1 %    % Immature Granulocytes 0 %    NRBCs per 100 WBC 0 <1 /100    Absolute Neutrophils 3.7 1.6 - 8.3 10e3/uL    Absolute Lymphocytes 1.2 0.8 - 5.3 10e3/uL    Absolute Monocytes 1.1 0.0 - 1.3 10e3/uL    Absolute Eosinophils 0.1 0.0 - 0.7 10e3/uL    Absolute Basophils 0.0 0.0 - 0.2 10e3/uL    Absolute Immature Granulocytes 0.0 <=0.4 10e3/uL    Absolute NRBCs 0.0 10e3/uL   TSH Reflex GH   Result Value Ref Range    TSH 0.76 0.30 - 4.20 uIU/mL   Salicylate level   Result Value Ref Range    Salicylate <0.3   mg/dL   Acetaminophen GH   Result Value Ref Range    Acetaminophen <5.0 (L) 10.0 - 30.0 ug/mL       Medications   clotrimazole (LOTRIMIN) 1 % cream (has no administration in time range)   bacitracin ointment 1 g (1 g Topical $Given 12/5/24 1150)       Assessments & Plan (with Medical Decision Making)  Yovanny Bonner is a 24 year old male with a worm in the right side of his neck and behind his ear. On the walk to the hospital he placed a bunch of snow on his neck to try and freeze and flush the worms out of his neck. He reports he initially had one on the left side that he had to pull out. He believes the worm moved to the right side of his  "neck. There is abrasions noted to the right side of his neck. He has been squeezing his neck to try and get the \"pin worm\" out of his neck. He can hear the worm \"squeal\" when it comes out and goes back inside. He can feel the sensation under his skin. Hx similar episode one month ago. He was hospitalized at that time with schizophrenic presentation with no formal diagnosis. He did not  his prescribed medication after discharge from the [facility]. He denies any recent drug use. Admits to alcohol use last night. He is homeless. He reports staying with friends.   VS in the ED. /74   Pulse 98   Temp 96.8  F (36  C) (Tympanic)   Resp 16   Ht 1.854 m (6' 1\")   Wt 72.6 kg (160 lb)   SpO2 98%   BMI 21.11 kg/m    Diagnostics:    Lab: CBC- normal. BMP- okay. Ethanol- negative. UDS- Acetaminophen- normal. Salicylate- normal. TSH- normal.     ED Course as of 12/05/24 1514   Thu Dec 05, 2024   1332 DEC in progress    1417 I spoke with SHA Atkinson . He is not a danger to himself. He does not feel he is holdable. His speech is organized. No suicidal or homicidal ideation. No paranoia.      Yovanny is a 25 y/o male evaluated today for a \"worm\" under his skin. DDx considered but not limited to includes substance induced psychosis, DT delirium from alcohol use, schizophrenia, delusional. He is awake and alert in no acute distress. No focal neuro deficits. Speech is organized. Oriented x 3. HR regular. No visible \"worm\" or insect on inspection to right neck. Superficial abrasions noted to right neck. Surrounding skin intact. No erythema.  No palpable mass or lumps in the area.     Labs are unremarkable. He is afebrile. No leukocytosis. No signs of infection. He is fixated on a sensation under his skin.  I ordered a DEC assessment. I spoke with SHA Atkinson . We discussed the case. He does not feel the patient is holdable. The pt is declining any services (outpatient or inpatient). The patient " is organized and calm. He denies SI and HI. I suspect possible substance use causing somatic delusions. Hx similar episode a month ago while using drugs. Unfortunately the patient was unable to provide urine for a UDS. He has neosporin at bedside that he is using on his superficial abrasions. Will order Lotrimin cream to his right neck to use twice daily as needed for possible tinea corporis.     Safety plan per DEC attached. Discussed strict return to ED precautions.      I have reviewed the nursing notes.    I have reviewed the findings, diagnosis, plan and need for follow up with the patient.  Medical Decision Making  The patient's presentation was of moderate complexity (a chronic illness mild to moderate exacerbation, progression, or side effect of treatment).    The patient's evaluation involved:  an assessment requiring an independent historian (see separate area of note for details)  ordering and/or review of 3+ test(s) in this encounter (see separate area of note for details)    The patient's management necessitated moderate risk (prescription drug management including medications given in the ED).    Final diagnoses:   Neck pain on right side   Abrasion of neck, initial encounter   Delusions of parasitosis (H)     12/5/2024   [facility] AND [facility] IslandIdalmisMarci, APRN CNP  12/05/24 1527

## 2024-12-05 NOTE — DISCHARGE INSTRUCTIONS
Writer encourage Pt to take his future medications if being prescribed and keep all of scheduled appointments with his outpatient service providers.  Writer recommended Pt to engage in new outpatient psychiatry for medication management and individual therapy service.  However, Pt declined to outpatient mental health resources and services.  DEC coordinator will contact Pt within next 1 or 2 business days to ensure coordination of care and provide assistance with appointments.    [facility] partners with your local Crisis Response Team (CRT) to provide follow-up after you leave the hospital. Someone from CRT will be reaching out to you via phone or will schedule an in-person visit as appropriate. You may also reach CRT by calling [phone]. Clients in the [facility] ([place], [place], [place], [place], [place], [place] and [facility]) dial 211 for immediate assistance. Their website is [email or web address]/   [facility] provides quality, compassionate care for mental health and substance abuse concerns that impact people of all ages. We can help with short-term needs, diagnosing and managing ongoing conditions, or navigating combined mental health and substance abuse issues. Offering psychiatry, counseling, and care coordination. Call [phone] if wanting to make an appointment.     Substance Use Disorder Direct Access Resources    It is recommended that you abstain from all mood altering chemicals. Please contact the sober support hotline (264-107-2327) as needed; phones are answered 24 hours a day, 7 days a week.    To access substance use treatment you must have a comprehensive assessment completed to begin any treatment program.     If uninsured, please contact your county of residence for eligibility screen to substance use disorder evaluation and treatment:    [place] - [phone]   [facility] [phone]   [facility] [phone]   [facility] [phone]    [place] - [phone]   [place] - [phone]   [place] - [phone]   [place] - [phone]     If you have private insurance, call the customer service number on the back of your insurance card to find an in-network substance abuse use disorder assessment. The ideal provider will be a treatment facility, licensed in the state of [place].     Community RUPINDER Evaluations: Clients may call their county for a full list of providers - Availability and services listed belo are subject to change, please call the provider to confirm    [facility]  [phone]  [address] [place] [address], [address]  *Please call the above number to schedule a comprehensive assessment for determination of level of care needs. In person and virtual appointments available Mon-Fri.    [facility], [address] [place] [address], First Floor, [address], [place] [address] (next to the outpatient lab)    Phone: [phone]   Provides bridging services to people with Opiate Use Disorders (OUD) seeking care. This is a front door to Medication Assisted Treatments (MAT), ages 16+  Walk In hours: Monday-Friday 9:00am-3:00pm    [facility]  [phone]  Walk in Assessments: Mon-Friday 7a-1:45p  [address], [place], [address]    [facility]  Central Access [phone]  [address] [place], [place], [address]  *by appointment only    [place]  [phone] (phone consultation available )  Locations in: [place], [place], [facility], and [place], [place]  Russian virtual IOP [phone] or visit [email or web address]   Also offers LGBTQ programming     [facility]ol[facility]izcar[facility]  [phone]  [address] [facility]  [place], [place], [address]  *Currently only offered via telehealth - call to set up an appointment    [facility]  [address] [place], [place], [address]  [facility]  For more  information to to make a referral call:  [phone]  Walk-in on Fridays  9-11 a.m.    [facility]  [phone]  [address] [place], [place], [address]  *available by appointments only    Lefty cameron  [phone]  [address] [place], [place], [address]  *available by appointment only    [facility]  [phone]  [address] [place], [place], [address]  *walk in assessments available M-F starting at 7 am.    [facility]  [phone]  Locations: [place], [facility], [facility], and [place]  *Walk in assessments availble M-F starting at 8 am -virtual only    [facility]  [phone]  [address] [place] [address]    [facility]  Virtual + Locations: [place], [place], [place], [place], [facility], [facility], [place], [place]   [phone]  *available by appointment only    [facility]  [phone]  [address]  [place], [place], [address]    [facility]  [phone]  [address] [place], [place], [address]  *available by appointment    [facility]  [phone]  [address] [place], [place], [address]  *walk ins available M-TH from 9-3    [facility] program: [phone]  [address] [place], [place], [address]    [place]  [phone]  Same day substance use disorder assessments are available Monday - Friday, via walk-in or by appointment at the [place] location.  [address] [facility] [facility], [place] [address]     [facility] - adolescent and adult SUDs services  [phone]  Offer services Monday through Friday, as well as evening hours Monday through Thursday. Normally, a first appointment will be scheduled within one week  [email or web address]  Locations all over [place]    If you are intoxicated, you may be required to detox at a detox facility before starting  treatment. The following are detox facilities that you can self present to. All detox facilities are able to help you complete an assessment prior to discharge if you choose:    [facility]: Arrive at a [facility] for immediate medical evaluation    [facility]: [address] [place], [place], [address].         [phone]    [facility]: [address] [place], [place], [address]  [phone]     [facility] ([facility]): [address] [place], [place], [address]  [phone]     [facility]: [address] [place], [place], [address], [phone]         Ways to help cope with sobriety:    -- Take prescribed medicines as scheduled  -- Keep follow-up appointments  -- Talk to others about your concerns  -- Get regular exercise  -- Practice deep breathing skills  -- Eat a healthy diet  -- Use community resources, including hotline numbers, [facility] crisis and support meetings  -- Stay sober and avoid places/people/things associated with substance use  --Maintain a daily schedule/routine  --Get at least 7-8 hours of sleep per night  --Create a list 10--20 healthy activities that you can do that are enjoyable and do not involve substance use  --Create daily goals (approx. 1-4 goals) per day and work to achieve them throughout the day.       Free Resources:    [place] [facility] ([facility])  [facility] connects people seeking recovery to resources that help foster and sustain long-term recovery. Whether you are seeking resources for treatment, transportation, housing, job training, education, health care or other pathways to recovery, [facility] is a great place to start.  Phone: 222.214.5341. www.[place]BIlprospekt.[employer] (Great listing of all types of recovery and non-recovery related resources)    Alcoholics Anonymous  Phone: 5-054-ALCOHOL  Website: HTTP://WWW.AA.ORG/  [facility] [place] (790-903-0358 or http://aaminneaSupportBee.org)  [facility] [place] (546-114-5221 or  www.aastpaul.org)     Narcotics Anonymous  Phone: 301.264.2590  Website: www.Bimici.[employer].    [facility] [employer] [address], [place], [place],  Phone: [phone]  Drop-in Hours: Monday-Friday 9-11:30 am. By appointment at other times.  Provides: [facility] is a drop-in center on the [place] of [place] that provides a safe space for individuals who are homeless and have a history of chemical use. Sobriety is not a requirement but drugs and alcohol are not allowed on the property.  Services: Non-clients can access drop-in services such as Recovery and Harm Reduction Groups, referrals to case management, community activities, shower facilities, and a pool table. Individuals who are homeless and have chemical health needs may be eligible for enrollment into [facility]'s case management program. Clients and  work together to access benefits, treatment, health care, shelter, and external housing resources.    [facility], start with Adult Shelter [facility] Overnight Shelter: [phone]  *Phone lines are closed between 5:30-7:30 pm    [address] (enter on the [address] side near the 8th  corner)  Walk-in Hours: 9 am - 5:30 pm Monday-Friday and 1 pm - 5:30 pm Saturday and Sunday    [facility]  [phone]  [address] [facility]  [phone]  [address] [facility]  [phone]  [address] [facility]  [phone]  [address] [facility]  [phone]  [address] [place] [address]    To start making a plan for a more long-term solution, contact the Coordinated Entry Homeless Assistance Program:    Coordinated Entry Homeless Assistance  [facility] [facility]  [address] [place] [address]  [phone]  Open Wednesdays and Thursdays 8 am-2 pm  The Coordinated Entry System is the  [facility]'s approach to organizing and providing housing services for people experiencing homelessness in [facility].  Because housing resources are limited, this process is designed to ensure that individuals and families with the highest vulnerability, service needs, and length of homelessness receive top priority in housing placement.    Assessment changes due to COVID-19 virus    [facility] family assessors will now be conducting assessments by phone. If you are working with a family staying in a place other than a [facility]-funded shelter and is eligible for Coordinated Entry, continue to contact [facility]  at [phone] to set up an assessment.    For single adults, [facility] will be suspending drop-in hours at [facility]. To have a Coordinated Entry assessment completed, call the [facility]' certified assessors: Juan at [phone] or Katelyn at [phone] directly to set up a time to meet    Call 211 at any time on any day for questions about services and resources available to you.      Family Shelters    [facility] Shelter Team  [phone]  [address] [facility], Floors 5 & 6              Youth, families & disabled adults    Hours: Mon-Fri 8:00 am-4:30 pm.  After-Hours Shelter Team  211      Drop-Ins    [facility]  [phone]  [address] [place] [address] ([facility])              Showers/Laundry (8-11am)              Health Care              Employment Services              Breakfast (7-8 am)              Lunch (11:30am-12:30pm)    Hours: Mon-Sat: Summer 7am-3pm; Winter 7am-4pm.      [facility] [phone]  [address] [place] [address]  Hours: Mon, Wed and Fri 9:00-11:30 am      Clothing    [facility]  [phone]  [address] [place] [address]  Hours: M-Th 10-11:30am & 1:30-3:30pm; Sat/Sun 9:30-10:30 am      Free Meals & Showers    [facility]  [phone]  [address] [place] [address]  Dinner:  Mon-Fri 5:30-6:30pm (No showers)    [facility]  [phone]  Meals ([address]): Women & Children M-F 8:30-9am; Everyone: M-F 12-1pm; Sat/Sun, 10:30-11:30 am  Showers ([address]): Mon-Fri 9-10 am    [facility]  [phone]  [address]  Dinner: Thurs - Mon 6 pm  Showers: Daily 8 - 4:30 pm    [facility]eless  [phone]  Clinics are in [place] shelters/drop-in centers.  Hours: Mon-Fri at varying sites. Call for sites/times. No insurance or appts required to receive care.  Clinic sites: [facility], [facility], [facility], [facility], [facility], [facility], [facility], [facility], [facility]       Lotrimin 1% cream as needed to right neck region. Can try cool compresses to the area for comfort. Tylenol or ibuprofen as needed for pain. Neosporin to superficial areas on the neck.     Please return to the emergency room if you experience signs of infection including redness, drainage (pus), swelling, fever, thoughts of self harm or any new concerns.

## 2024-12-05 NOTE — ED NOTES
Pt unhappy with his care while he has been here and states we still havnt taken his complaints seriously. I acknowledged him and reinforced we do not believe he has a worm/insect that crawls through his skin and pops out to scream periodically. But believe he needs to follw up for mental health and stop picking at his skin as he may get an infection. Pt states when he finally gets the specimen out of his skin he will bring it in for evidence and chana us. I apologized for him not feeling like we took his c/o seriously, gave him community resources and told him it is his right to seek medical care. And we would be happy to assess and evaluate him if he came back.

## 2024-12-05 NOTE — ED NOTES
Placed call to the Crisis Response Team (CRT) at [phone]. Spoke with Bhavani and provided clinical information. CRT will follow up upon pt discharging to the community by phone or in person, as appropriate. Faxed assessment to CRT at [phone].

## 2024-12-05 NOTE — CONSULTS
Diagnostic Evaluation Consultation  Crisis Assessment    Patient Name: Yovanny Bonner  Age:  24 year old  Legal Sex: male  Gender Identity: male  Pronouns:   Race: White  Ethnicity: Not  or   Language: English      Patient was assessed: Virtual: [employer]   Crisis Assessment Start Date: 12/05/24  Crisis Assessment Start Time: 1322  Crisis Assessment Stop Time: 1411  Patient location: [facility] AND [facility]                             [id number]    Referral Data and Chief Complaint  Yovanny Bonner presents to the ED by  self. Patient is presenting to the ED for the following concerns: Health stressors, Anxiety.   Factors that make the mental health crisis life threatening or complex are:  Pt had recent psychiatric hospitalization at [facility] [place] from 11/1/24 to 11/4/24 due to delusional and hallucination about having worm in his right side of ear.  Pt returned to the ER again today with similar presentation.  Pt reported he has no mental health issues, did not need to take psychiatric medications and did not need outpatient mental health services..      Informed Consent and Assessment Methods  Explained the crisis assessment process, including applicable information disclosures and limits to confidentiality, assessed understanding of the process, and obtained consent to proceed with the assessment.  Assessment methods included conducting a formal interview with patient, review of medical records, collaboration with medical staff, and obtaining relevant collateral information from family and community providers when available.  : done     Patient response to interventions: eager to participate, acceptance expressed, verbalizes understanding  Coping skills were attempted to reduce the crisis:  hiking, walking, doing woodworks, crafts to sell for profits, building model cars and planes, being outdoors     History of the Crisis   Pt is a 24 year old White male with history of autism, ADHD, anxiety and  "psychosis.  Pt was brought himself to the ER today due to worsening of right side of ear pain and having a sensation of worm in his ear.  Pt was sleeping in his ER room when staff prompted him to engage in his DEC Assessment.  Pt was able to arouse himself from sleep as he engaged.  Pt remarked, \"I need help with ear problem, on my right side, getting people to listen to me and not being dismissive.\" as his reason for visiting the ER today.  Pt shared he has been to [facility] and [facility] to address his infection of skin of his neck and ear problems, but nothing got resolved.  Pt reported he has some shooting pain in his right ear as well as he has a worm growing in his ear that needs to be taken out.  Pt reported he did not know how the worm got into his right ear but he was worried it might grow and damage his ear.  Pt remarked, \"I can feel it, seen it and heard it making squeal sound when I tried to pull it out.\"  Pt reported he was not delusional and not hallucinating about the worm as he already pulled out one worm and his friends had seen it.  Pt did not identify any other stressors or triggers as he was focused on his ear discomfort and worm inside.  Pt endorsed baseline depression and anxiety.  Pt reported having normal sleep and appetite.  Pt denied having paranoia, auditory and visual hallucinations.  Pt denied having suicidal and homicidal ideations.  Pt denied having access tofirearms, history of SIB and previous suicide attempt.    Brief Psychosocial History  Family:  Single, Children no  Support System:  Parent(s), Grandparent(s), Friend  Employment Status:  unemployed  Source of Income:  unable to assess  Financial Environmental Concerns:  unemployed, unable to afford rent/mortgage  Current Hobbies:  arts/crafts, music, social media/computer activities, television/movies/videos, exercise/fitness, outdoor activities  Barriers in Personal Life:  behavioral concerns, mental health concerns, " financial concerns, lack of motivation, emotional concerns    Significant Clinical History  Current Anxiety Symptoms:  anxious  Current Depression/Trauma:  helplessness, sadness, irritable, impaired decision making  Current Somatic Symptoms:  anxious  Current Psychosis/Thought Disturbance:  tactile hallucinations  Current Eating Symptoms:     Chemical Use History:  Alcohol: Other (comments) (Pt reported he rarely drink alcohol.)  Last Use:: 12/04/24  Benzodiazepines: None  Opiates: None  Cocaine: None  Marijuana: Occasional  Last Use:: 12/04/24  Other Use: None   Past diagnosis:  Anxiety Disorder, Depression, Substance Use Disorder, Autism, Schizophrenia  Family history:  No known history of mental health or chemical health concerns  Past treatment:  Primary Care, Psychiatric Medication Management, Inpatient Hospitalization  Details of most recent treatment:  Pt had recent psychiatric hospitalization at [facility] from 11/1/24 to 11/4/24.  Pt reported he has no current outpatient mental health service providers.  Other relevant history:  Pt reported his parents were  and he has 4 brothers and 2 sisters.  Pt reported he was single, has no children and unemployed.  Pt reported he was being homeless but has been staying at his friends' places and shelter.  Pt identified having right ear problem as his medical condition.  Pt reported he has theft charge from [place] and has disorderly conduct charge for fighting the police as his legal issues.       Collateral Information  Is there collateral information: No (Pt refused writer contacting his emergency  for collateral.)     Collateral information name, relationship, phone number:       What happened today:       What is different about patient's functioning:       Concern about alcohol/drug use:      What do you think the patient needs:      Has patient made comments about wanting to kill themselves/others:      If d/c is recommended,  can they take part in safety/aftercare planning:       Additional collateral information:        Risk Assessment  [place]win Suicide Severity Rating Scale Full Clinical Version:  Suicidal Ideation  Q1 Wish to be Dead (Lifetime): Yes  Q2 Non-Specific Active Suicidal Thoughts (Lifetime): Yes  3. Active Suicidal Ideation with any Methods (Not Plan) Without Intent to Act (Lifetime): Yes  Q4 Active Suicidal Ideation with Some Intent to Act, Without Specific Plan (Lifetime): Yes  Q5 Active Suicidal Ideation with Specific Plan and Intent (Lifetime): Yes  Q6 Suicide Behavior (Lifetime): yes     Suicidal Behavior (Lifetime)  Actual Attempt (Lifetime): Yes  Total Number of Actual Attempts (Lifetime): 1  Actual Attempt Description (Lifetime): Pt reported previous suicide attempt when he was a kid but did not remember further details.  Has subject engaged in non-suicidal self-injurious behavior? (Lifetime): No    Irwin Suicide Severity Rating Scale Recent:   Suicidal Ideation (Recent)  Q1 Wished to be Dead (Past Month): no  Q2 Suicidal Thoughts (Past Month): no  Level of Risk per Screen: no risks indicated     Suicidal Behavior (Recent)  Actual Attempt (Past 3 Months): No  Has subject engaged in non-suicidal self-injurious behavior? (Past 3 Months): No  Interrupted Attempts (Past 3 Months): No  Aborted or Self-Interrupted Attempt (Past 3 Months): No  Preparatory Acts or Behavior (Past 3 Months): No    Environmental or Psychosocial Events: legal issues such as DWI, DUI, lawsuit, CPS involvement, etc., bullied/abused, work or task failure, helplessness/hopelessness, impulsivity/recklessness, barriers to accessing healthcare, unemployment/underemployment, unstable housing, homelessness, other life stressors, recent life events (see comment), neither working nor attending school, social isolation, ongoing abuse of substances, excessive debt, poor finances  Protective Factors: Protective Factors: lives in a responsibly safe and  stable environment, help seeking, constructive use of leisure time, enjoyable activities, resilience    Does the patient have thoughts of harming others? Feels Like Hurting Others: no  Previous Attempt to Hurt Others: no  Current presentation:  (Pt was calm, alert, oriented, engaged and cooperative.)  Is the patient engaging in sexually inappropriate behavior?: no    Is the patient engaging in sexually inappropriate behavior?  no        Mental Status Exam   Affect: Constricted  Appearance: Disheveled  Attention Span/Concentration: Attentive  Eye Contact: Variable    Fund of Knowledge: Appropriate   Language /Speech Content: Fluent  Language /Speech Volume: Normal, Soft  Language /Speech Rate/Productions: Normal, Slow  Recent Memory: Variable  Remote Memory: Variable  Mood: Anxious, Apathetic, Depressed, Sad  Orientation to Person: Yes   Orientation to Place: Yes  Orientation to Time of Day: Yes  Orientation to Date: Yes     Situation (Do they understand why they are here?): Yes  Psychomotor Behavior: Normal  Thought Content: Clear, Hallucinations, Delusions  Thought Form: Intact     Mini-Cog Assessment  Number of Words Recalled:    Clock-Drawing Test:     Three Item Recall:    Mini-Cog Total Score:       Medication  Psychotropic medications:   Medication Orders - Psychiatric (From admission, onward)      None             Current Care Team  Patient Care Team:  Joel Mendoza MD as PCP - General (Family Medicine)  Jerilyn Escamilla PA-C as Assigned PCP    Diagnosis  Patient Active Problem List   Diagnosis Code    ADHD F90.9    ADHD, hyperactive-impulsive type F90.1    Autism spectrum disorder F84.0    Dental caries K02.9    MARIBEL (generalized anxiety disorder) F41.1    Other specified pervasive developmental disorders, current or active state F84.8    Aggressive behavior R46.89    Anxiety state F41.1    Auditory hallucinations R44.0    Homicidal ideations R45.850    Suicidal ideation R45.851    Delusional disorder  (H) F22       Primary Problem This Admission  Active Hospital Problems    Delusional disorder (H)      Autism spectrum disorder      MARIBEL (generalized anxiety disorder)        Clinical Summary and Substantiation of Recommendations   Pt presenting in the ER today due to worsening of right ear pain and having worm in it.  Pt was calm, alert, organized, oriented and engaged in his DEC Assessment.  However, Pt appeared to be delusional about having worm in his ear and also experiencing tactile hallucination as he could feel the worm in his ear and hear the squeal sound when he tried to get it out of his ear.  Pt denied having delusion and hallucination as he said he was able to pull out a worm and showed it to his friends who witnessed it.  Pt had recent psychiatric hospitalization at [facility] from 11/1/24 to 11/4/24 due to delusion and hallucination.  Pt reported rarely drinking alcohol and using THC. Pt denied using other illicit substances but he did not provide his urine sample for UDS in the ER today.  Pt did not identify any other stressors or triggers other than having a worm problem in his right ear.  Pt endorsed baseline depression and anxiety. Pt reported having normal sleep and appetite. Pt denied having paranoia, auditory and visual hallucinations. Pt denied having suicidal and homicidal ideations. Pt denied having access tofirearms, history of SIB and previous suicide attempt.  Pt was able to engage in his DEC Aftercare plan as he felt safe to be discharged back to his friend's place.  Pt was able to identify his coping skills and support system to mitigate his current mental health crisis.  Pt was not imminent danger to himself or to others.  Pt was functioning at his baseline and not holdable.  Writer recommended Pt to engage in outpatient psychiatry for medication management and individual therapy service.                          Patient coping skills attempted to reduce the crisis:  hiking,  walking, doing woodworks, crafts to sell for profits, building model cars and planes, being outdoors    Disposition  Recommended disposition: Individual Therapy, Medication Management        Reviewed case and recommendations with attending provider. Attending Name: Marci Schulz APRN CNP       Attending concurs with disposition: yes       Patient and/or validated legal guardian concurs with disposition:   yes       Final disposition:  discharge    Legal status on admission:      Assessment Details   Total duration spent with the patient: 49 min     CPT code(s) utilized: 17752 - Psychotherapy for Crisis - 60 (30-74*) min    BINH Jeronimo, Psychotherapist  DEC - Triage & Transition Services  Callback: [phone]

## 2024-12-30 PROCEDURE — 99284 EMERGENCY DEPT VISIT MOD MDM: CPT | Performed by: PHYSICIAN ASSISTANT

## 2024-12-30 ASSESSMENT — COLUMBIA-SUICIDE SEVERITY RATING SCALE - C-SSRS
2. HAVE YOU ACTUALLY HAD ANY THOUGHTS OF KILLING YOURSELF IN THE PAST MONTH?: NO
6. HAVE YOU EVER DONE ANYTHING, STARTED TO DO ANYTHING, OR PREPARED TO DO ANYTHING TO END YOUR LIFE?: YES
1. IN THE PAST MONTH, HAVE YOU WISHED YOU WERE DEAD OR WISHED YOU COULD GO TO SLEEP AND NOT WAKE UP?: YES

## 2024-12-31 ENCOUNTER — HOSPITAL ENCOUNTER (EMERGENCY)
Facility: OTHER | Age: 24
Discharge: HOME | End: 2024-12-31
Attending: PHYSICIAN ASSISTANT
Payer: COMMERCIAL

## 2024-12-31 VITALS
OXYGEN SATURATION: 98 % | BODY MASS INDEX: 20.99 KG/M2 | DIASTOLIC BLOOD PRESSURE: 70 MMHG | SYSTOLIC BLOOD PRESSURE: 120 MMHG | RESPIRATION RATE: 18 BRPM | TEMPERATURE: 97.9 F | HEIGHT: 72 IN | HEART RATE: 87 BPM | WEIGHT: 155 LBS

## 2024-12-31 DIAGNOSIS — H60.11 CELLULITIS OF RIGHT EAR: ICD-10-CM

## 2024-12-31 DIAGNOSIS — L30.9 ECZEMA, UNSPECIFIED TYPE: ICD-10-CM

## 2024-12-31 PROCEDURE — 250N000013 HC RX MED GY IP 250 OP 250 PS 637: Performed by: PHYSICIAN ASSISTANT

## 2024-12-31 RX ORDER — SULFAMETHOXAZOLE AND TRIMETHOPRIM 800; 160 MG/1; MG/1
1 TABLET ORAL ONCE
Status: COMPLETED | OUTPATIENT
Start: 2024-12-31 | End: 2024-12-31

## 2024-12-31 RX ORDER — HYDROCODONE BITARTRATE AND ACETAMINOPHEN 5; 325 MG/1; MG/1
1 TABLET ORAL EVERY 6 HOURS PRN
Qty: 12 TABLET | Refills: 0 | Status: SHIPPED | OUTPATIENT
Start: 2024-12-31 | End: 2024-12-31

## 2024-12-31 RX ORDER — HYDROXYZINE PAMOATE 25 MG/1
25 CAPSULE ORAL 4 TIMES DAILY PRN
Qty: 12 CAPSULE | Refills: 0 | Status: SHIPPED | OUTPATIENT
Start: 2024-12-31

## 2024-12-31 RX ORDER — HYDROCODONE BITARTRATE AND ACETAMINOPHEN 5; 325 MG/1; MG/1
1 TABLET ORAL ONCE
Status: COMPLETED | OUTPATIENT
Start: 2024-12-31 | End: 2024-12-31

## 2024-12-31 RX ORDER — IBUPROFEN 600 MG/1
600 TABLET, FILM COATED ORAL EVERY 6 HOURS PRN
Qty: 30 TABLET | Refills: 0 | Status: SHIPPED | OUTPATIENT
Start: 2024-12-31

## 2024-12-31 RX ORDER — HYDROXYZINE PAMOATE 25 MG/1
25 CAPSULE ORAL ONCE
Status: COMPLETED | OUTPATIENT
Start: 2024-12-31 | End: 2024-12-31

## 2024-12-31 RX ORDER — HYDROCODONE BITARTRATE AND ACETAMINOPHEN 5; 325 MG/1; MG/1
1 TABLET ORAL EVERY 6 HOURS PRN
Qty: 12 TABLET | Refills: 0 | Status: SHIPPED | OUTPATIENT
Start: 2024-12-31

## 2024-12-31 RX ORDER — SULFAMETHOXAZOLE AND TRIMETHOPRIM 800; 160 MG/1; MG/1
1 TABLET ORAL 2 TIMES DAILY
Qty: 14 TABLET | Refills: 0 | Status: SHIPPED | OUTPATIENT
Start: 2024-12-31

## 2024-12-31 RX ADMIN — SULFAMETHOXAZOLE AND TRIMETHOPRIM 1 TABLET: 800; 160 TABLET ORAL at 00:51

## 2024-12-31 RX ADMIN — HYDROCODONE BITARTRATE AND ACETAMINOPHEN 1 TABLET: 5; 325 TABLET ORAL at 00:52

## 2024-12-31 RX ADMIN — HYDROXYZINE PAMOATE 25 MG: 25 CAPSULE ORAL at 00:51

## 2024-12-31 RX ADMIN — IBUPROFEN 600 MG: 200 TABLET, FILM COATED ORAL at 00:51

## 2024-12-31 ASSESSMENT — ACTIVITIES OF DAILY LIVING (ADL): ADLS_ACUITY_SCORE: 46
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
## 2024-12-31 NOTE — DISCHARGE INSTRUCTIONS
-It looks like you have eczema around your ear as well as surrounding cellulitis.  -Take Bactrim twice daily for 7 days for treatment of presumed cellulitis.  -Use ibuprofen, Norco, and Vistaril for inflammation and pain.  -Recommend close follow-up with your primary care doctor for repeat evaluation.  -If you have any new or worsening symptoms, please return to the ER for repeat evaluation.

## 2024-12-31 NOTE — ED TRIAGE NOTES
Pt presents to ED via private car with c/o Rt ear pain that started a few months ago. Pt has been trying an antifungal cream, antibiotic ointment, and garlic pills. /75   Pulse 102   Temp 97.6  F (36.4  C) (Tympanic)   Resp 18   Ht 1.829 m (6')   Wt 70.3 kg (155 lb)   SpO2 97%   BMI 21.02 kg/m         Triage Assessment (Adult)       Row Name 12/30/24 2906          Triage Assessment    Airway WDL WDL        Respiratory WDL    Respiratory WDL WDL        Skin Circulation/Temperature WDL    Skin Circulation/Temperature WDL WDL        Cardiac WDL    Cardiac WDL WDL        Peripheral/Neurovascular WDL    Peripheral Neurovascular WDL WDL        Cognitive/Neuro/Behavioral WDL    Cognitive/Neuro/Behavioral WDL WDL

## 2025-02-25 ENCOUNTER — HOSPITAL ENCOUNTER (EMERGENCY)
Facility: OTHER | Age: 25
Discharge: HOME | End: 2025-02-26
Payer: COMMERCIAL

## 2025-02-25 DIAGNOSIS — T69.021A IMMERSION FOOT, RIGHT FOOT, INITIAL ENCOUNTER: ICD-10-CM

## 2025-02-25 DIAGNOSIS — T69.022A IMMERSION FOOT, LEFT FOOT, INITIAL ENCOUNTER: ICD-10-CM

## 2025-02-25 LAB
ANION GAP SERPL CALCULATED.3IONS-SCNC: 16 MMOL/L (ref 7–15)
BASOPHILS # BLD AUTO: 0.1 10E3/UL (ref 0–0.2)
BASOPHILS NFR BLD AUTO: 1 %
BUN SERPL-MCNC: 13.5 MG/DL (ref 6–20)
CALCIUM SERPL-MCNC: 9.6 MG/DL (ref 8.8–10.4)
CHLORIDE SERPL-SCNC: 100 MMOL/L (ref 98–107)
CREAT SERPL-MCNC: 0.81 MG/DL (ref 0.67–1.17)
EGFRCR SERPLBLD CKD-EPI 2021: >90 ML/MIN/1.73M2
EOSINOPHIL # BLD AUTO: 0.1 10E3/UL (ref 0–0.7)
EOSINOPHIL NFR BLD AUTO: 1 %
ERYTHROCYTE [DISTWIDTH] IN BLOOD BY AUTOMATED COUNT: 12.8 % (ref 10–15)
GLUCOSE SERPL-MCNC: 134 MG/DL (ref 70–99)
HCO3 SERPL-SCNC: 20 MMOL/L (ref 22–29)
HCT VFR BLD AUTO: 36.3 % (ref 40–53)
HGB BLD-MCNC: 13 G/DL (ref 13.3–17.7)
IMM GRANULOCYTES # BLD: 0 10E3/UL
IMM GRANULOCYTES NFR BLD: 0 %
LYMPHOCYTES # BLD AUTO: 1.4 10E3/UL (ref 0.8–5.3)
LYMPHOCYTES NFR BLD AUTO: 15 %
MCH RBC QN AUTO: 29.5 PG (ref 26.5–33)
MCHC RBC AUTO-ENTMCNC: 35.8 G/DL (ref 31.5–36.5)
MCV RBC AUTO: 83 FL (ref 78–100)
MONOCYTES # BLD AUTO: 1.5 10E3/UL (ref 0–1.3)
MONOCYTES NFR BLD AUTO: 15 %
NEUTROPHILS # BLD AUTO: 6.5 10E3/UL (ref 1.6–8.3)
NEUTROPHILS NFR BLD AUTO: 69 %
NRBC # BLD AUTO: 0 10E3/UL
NRBC BLD AUTO-RTO: 0 /100
PLATELET # BLD AUTO: 304 10E3/UL (ref 150–450)
POTASSIUM SERPL-SCNC: 3.2 MMOL/L (ref 3.4–5.3)
RBC # BLD AUTO: 4.4 10E6/UL (ref 4.4–5.9)
SODIUM SERPL-SCNC: 136 MMOL/L (ref 135–145)
WBC # BLD AUTO: 9.5 10E3/UL (ref 4–11)

## 2025-02-25 PROCEDURE — 80048 BASIC METABOLIC PNL TOTAL CA: CPT

## 2025-02-25 PROCEDURE — 36415 COLL VENOUS BLD VENIPUNCTURE: CPT

## 2025-02-25 PROCEDURE — 85025 COMPLETE CBC W/AUTO DIFF WBC: CPT

## 2025-02-25 PROCEDURE — 99283 EMERGENCY DEPT VISIT LOW MDM: CPT

## 2025-02-25 PROCEDURE — 250N000013 HC RX MED GY IP 250 OP 250 PS 637

## 2025-02-25 RX ADMIN — IBUPROFEN 600 MG: 200 TABLET, FILM COATED ORAL at 22:43

## 2025-02-25 ASSESSMENT — ACTIVITIES OF DAILY LIVING (ADL)
ADLS_ACUITY_SCORE: 46

## 2025-02-25 ASSESSMENT — COLUMBIA-SUICIDE SEVERITY RATING SCALE - C-SSRS
2. HAVE YOU ACTUALLY HAD ANY THOUGHTS OF KILLING YOURSELF IN THE PAST MONTH?: NO
1. IN THE PAST MONTH, HAVE YOU WISHED YOU WERE DEAD OR WISHED YOU COULD GO TO SLEEP AND NOT WAKE UP?: NO
6. HAVE YOU EVER DONE ANYTHING, STARTED TO DO ANYTHING, OR PREPARED TO DO ANYTHING TO END YOUR LIFE?: NO

## 2025-02-25 ASSESSMENT — ENCOUNTER SYMPTOMS: WOUND: 1

## 2025-02-26 VITALS
HEART RATE: 92 BPM | DIASTOLIC BLOOD PRESSURE: 87 MMHG | BODY MASS INDEX: 21.02 KG/M2 | RESPIRATION RATE: 30 BRPM | TEMPERATURE: 98.5 F | OXYGEN SATURATION: 97 % | HEIGHT: 72 IN | SYSTOLIC BLOOD PRESSURE: 144 MMHG

## 2025-02-26 PROCEDURE — 250N000013 HC RX MED GY IP 250 OP 250 PS 637: Performed by: STUDENT IN AN ORGANIZED HEALTH CARE EDUCATION/TRAINING PROGRAM

## 2025-02-26 RX ORDER — POTASSIUM CHLORIDE 1500 MG/1
20 TABLET, EXTENDED RELEASE ORAL ONCE
Status: DISCONTINUED | OUTPATIENT
Start: 2025-02-26 | End: 2025-02-26

## 2025-02-26 RX ORDER — POTASSIUM CHLORIDE 20MEQ/15ML
20 LIQUID (ML) ORAL ONCE
Status: COMPLETED | OUTPATIENT
Start: 2025-02-26 | End: 2025-02-26

## 2025-02-26 RX ADMIN — POTASSIUM CHLORIDE 20 MEQ: 20 SOLUTION ORAL at 07:49

## 2025-02-26 ASSESSMENT — ACTIVITIES OF DAILY LIVING (ADL)
ADLS_ACUITY_SCORE: 46

## 2025-02-26 NOTE — ED PROVIDER NOTES
History     Chief Complaint   Patient presents with    Foot Problem     HPI  Yovanny Bonner is a 24 year old male who was brought in by PD for foot pain, delusions, concern for methamphetamine psychosis with things coming out of his skin.     Per prior provider note:   Feet appear like immersion foot which [facility] recommends keeping clean and dry. Patient currently has no dry boots, is impaired and homeless, and therefore will need help with  (social work consult placed). Detox may be appropriate. Dressing change order placed. Vouuntary so far.     Allergies:  Allergies   Allergen Reactions    Banana Itching    Kiwi Extract Itching    Seasonal Allergies     Zoloft [Sertraline] Hives       Problem List:    Patient Active Problem List    Diagnosis Date Noted    Delusional disorder (H) 12/05/2024     Priority: Medium    Dental caries 01/24/2018     Priority: Medium    ADHD, hyperactive-impulsive type 10/02/2015     Priority: Medium    Aggressive behavior 08/05/2014     Priority: Medium    Anxiety state 08/05/2014     Priority: Medium     Formatting of this note might be different from the original. IMO Update      Auditory hallucinations 08/05/2014     Priority: Medium    Homicidal ideations 08/05/2014     Priority: Medium    Suicidal ideation 08/05/2014     Priority: Medium    Autism spectrum disorder 03/13/2014     Priority: Medium    MARIBEL (generalized anxiety disorder) 03/13/2014     Priority: Medium    ADHD 07/26/2011     Priority: Medium    Other specified pervasive developmental disorders, current or active state 07/26/2011     Priority: Medium        Past Medical History:    Past Medical History:   Diagnosis Date    Anxiety disorder     Autistic disorder     Pervasive developmental disorder        Past Surgical History:    Past Surgical History:   Procedure Laterality Date    DENTAL SURGERY      No Comments Provided       Family History:    Family History   Problem Relation Age of Onset     Unknown/Adopted Father     No Known Problems Sister     No Known Problems Sister        Social History:  Marital Status:  Single [1]  Social History     Tobacco Use    Smoking status: Every Day     Types: Cigarettes, Vaping Device     Passive exposure: Never    Smokeless tobacco: Never   Vaping Use    Vaping status: Every Day   Substance Use Topics    Alcohol use: Yes     Comment: 2 drinks last night    Drug use: Yes     Types: Marijuana        Medications:    No current outpatient medications on file.        Review of Systems    Physical Exam   BP: (!) 144/87  Pulse: 99  Temp: 98.5  F (36.9  C)  Resp: 16  Height: 182.9 cm (6')  SpO2: 93 %      Physical Exam    ED Course     ED Course as of 02/26/25 1357   Wed Feb 26, 2025   0027 I consulted with Dr. Vargas, at [facility] [place] [facility]. We discussed the case and images were sent. She recommends keeping the feet clean and dry. Place a non-adherent adaptic or xeroform and then a dry layer gauze. Change dressings daily. Change dressings daily and wash with mild soap. Recommends dry shoes. Her team will reach out to him to set up telemedicine follow up visits.      Procedures              Critical Care time:  none     None         Results for orders placed or performed during the hospital encounter of 02/25/25 (from the past 24 hours)   CBC with platelets differential    Narrative    The following orders were created for panel order CBC with platelets differential.  Procedure                               Abnormality         Status                     ---------                               -----------         ------                     CBC with platelets and d...[[id number]]  Abnormal            Final result                 Please view results for these tests on the individual orders.   Basic metabolic panel   Result Value Ref Range    Sodium 136 135 - 145 mmol/L    Potassium 3.2 (L) 3.4 - 5.3 mmol/L    Chloride 100 98 - 107 mmol/L    Carbon Dioxide (CO2) 20 (L) 22 - 29 mmol/L     Anion Gap 16 (H) 7 - 15 mmol/L    Urea Nitrogen 13.5 6.0 - 20.0 mg/dL    Creatinine 0.81 0.67 - 1.17 mg/dL    GFR Estimate >90 >60 mL/min/1.73m2    Calcium 9.6 8.8 - 10.4 mg/dL    Glucose 134 (H) 70 - 99 mg/dL   CBC with platelets and differential   Result Value Ref Range    WBC Count 9.5 4.0 - 11.0 10e3/uL    RBC Count 4.40 4.40 - 5.90 10e6/uL    Hemoglobin 13.0 (L) 13.3 - 17.7 g/dL    Hematocrit 36.3 (L) 40.0 - 53.0 %    MCV 83 78 - 100 fL    MCH 29.5 26.5 - 33.0 pg    MCHC 35.8 31.5 - 36.5 g/dL    RDW 12.8 10.0 - 15.0 %    Platelet Count 304 150 - 450 10e3/uL    % Neutrophils 69 %    % Lymphocytes 15 %    % Monocytes 15 %    % Eosinophils 1 %    % Basophils 1 %    % Immature Granulocytes 0 %    NRBCs per 100 WBC 0 <1 /100    Absolute Neutrophils 6.5 1.6 - 8.3 10e3/uL    Absolute Lymphocytes 1.4 0.8 - 5.3 10e3/uL    Absolute Monocytes 1.5 (H) 0.0 - 1.3 10e3/uL    Absolute Eosinophils 0.1 0.0 - 0.7 10e3/uL    Absolute Basophils 0.1 0.0 - 0.2 10e3/uL    Absolute Immature Granulocytes 0.0 <=0.4 10e3/uL    Absolute NRBCs 0.0 10e3/uL       Medications   Tdap (tetanus-diphtheria-acell pertussis) (ADACEL) injection 0.5 mL (0 mLs Intramuscular Hold 2/25/25 8997)   ibuprofen (ADVIL/MOTRIN) tablet 600 mg (600 mg Oral $Given 2/25/25 3238)   potassium chloride (KAYCIEL) solution 20 mEq (20 mEq Oral $Given 2/26/25 8717)       Assessments & Plan (with Medical Decision Making)     I have reviewed the nursing notes.    I have reviewed the findings, diagnosis, plan and need for follow up with the patient.           Medical Decision Making  The patient's presentation was of straightforward complexity (a clearly self-limited or minor problem).    The patient's evaluation involved:  history and exam without other MDM data elements    The patient's management necessitated only low risk treatment.        New Prescriptions    No medications on file       Final diagnoses:   Immersion foot, right foot, initial encounter   Immersion foot,  left foot, initial encounter   We were able to get dry shoes and socks for the patient.  Outpatient referral.  He declined any detox treatment.  He also asked for resources for dentist.    2/25/2025   [facility]       Smita Katz, DO  02/26/25 1332       Smita Katz, DO  02/26/25 1670

## 2025-02-26 NOTE — ED NOTES
Pt received new socks and boots, pt is very appreciative. This writer explained how to do his dressing changes and supplies.Pt states understanding. Pt states that he does not want the cowboy boots he came into the ER wearing. Pt asked if we can dispose of them.

## 2025-02-26 NOTE — PROGRESS NOTES
:    Attempted to meet with patient, but he was sound asleep.    AGUSTÍN Vo on 2/26/2025 at 10:45 AM       :    Patient presented to the ED with wet boots and socks and is having problems with his feet due to his wet boots.  Patient is stating he walks all over town as he does not have a vehicle. Provider is asking if hospital could get him a pair of waterproof boots and a few pairs of socks or his feet worsen in time.    Met with patient in room to discuss housing/resources.  Patient stated he is couch hopping right now and does not have a permanent residence.  Offered him [facility] resources and he declined stating he has been threre so many times and they do not help him.  Patient stated he signed up to join the [employer] a week ago and hopes they call him.  Patient denies Meth use and stated he quit yesterday.  Offered patient detox and he declined to go.  Patient is requesting to leave and stated he does not need anything else.  Went to [employer] and bought him a few pairs of socks and waterproof boots with patient crisis fund.  Patient was very appreciative and chose to leave his old boots here.    AGUSTÍN Vo on 2/26/2025 at 2:17 PM

## 2025-02-26 NOTE — ED TRIAGE NOTES
Pt arrives via PD with c/o foot problem. PD found pt walking on side of road, pd states that pt is homeless and is having a foot problem. Pt states that there are skin particles falling off of his feet, pt states they are present on his pants as well, nothing visualized by writer. Pt having hard time sitting still.      Triage Assessment (Adult)       Row Name 02/25/25 2019          Triage Assessment    Airway WDL WDL        Respiratory WDL    Respiratory WDL WDL        Skin Circulation/Temperature WDL    Skin Circulation/Temperature WDL X        Cardiac WDL    Cardiac WDL WDL        Peripheral/Neurovascular WDL    Peripheral Neurovascular WDL WDL        Cognitive/Neuro/Behavioral WDL    Cognitive/Neuro/Behavioral WDL WDL

## 2025-02-26 NOTE — PHARMACY-ADMISSION MEDICATION HISTORY
Pharmacist Admission Medication History    Admission medication history is complete. The information provided in this note is only as accurate as the sources available at the time of the update.    Information Source(s): Patient and CareEverywhere/SureScripts via in-person    Pertinent Information: Patient denies any medication use.    Changes made to PTA medication list:  Added: None  Deleted: Augmentin, clotrimazole, norco, hydroxyzine, ibuprofen, bactrim  Changed: None    Allergies reviewed with patient and updates made in EHR: yes    Medication History Completed By: Kenisha Garrison RPH 2/26/2025 1:10 PM    No outpatient medications have been marked as taking for the 2/25/25 encounter (Hospital Encounter).

## 2025-02-26 NOTE — DISCHARGE INSTRUCTIONS
Keep feet clean and dry. Place a non-adherent adaptic or xeroform and then a dry layer gauze. Change dressings daily. Change dressings daily and wash with mild soap. dry shoes.

## 2025-02-26 NOTE — ED PROVIDER NOTES
Transfer of care from previous shift provider:. PD presents patient who was found walking outdoors complaining of foot pain. Delusion and concern for methamphetamine psychosis with things coming out of his skin. Feet appear like immersion foot which [facility] recommends keeping clean and dry. Patient currently has no dry boots, is impaired and homeless, and therefore will need help with  (social work consult placed). Detox may be appropriate. Dressing change order placed. Vouuntary so far.    ED Course as of 02/26/25 1815 Wed Feb 26, 2025 0027 I consulted with Dr. Vargas, at [facility]'s burn. We discussed the case and images were sent. She recommends keeping the feet clean and dry. Place a non-adherent adaptic or xeroform and then a dry layer gauze. Change dressings daily. Change dressings daily and wash with mild soap. Recommends dry shoes. Her team will reach out to him to set up telemedicine follow up visits.      Assessment and Plan:  Final diagnoses:   Immersion foot, right foot, initial encounter   Immersion foot, left foot, initial encounter      Stable overnight. Awaiting social work recommendations. Signed out to Dr Katz.     Ronald Bailey MD  02/26/25 0637       Smita Katz DO  02/26/25 1815

## 2025-02-26 NOTE — ED PROVIDER NOTES
History     Chief Complaint   Patient presents with    Foot Problem     HPI  Yovanny Bonner is a 24 year old male who presents via police with a foot problem. Police reported to nursing that they found the pt walking on the side of the road. He is homeless and is having a foot problem. He is concerned that things that are coming out of his skin behind his ears are now coming out of his feet. He reports white particles falling off his feet and are present on his pants. The pt removed his boots and dressings that he had placed to bilateral feet. He reports the bottoms of his feet have been white, itchy, and painful for 2-3 weeks. He has been placing ace bandages and tape to the bottoms of his feet. He admits to using methamphetamine.     Allergies:  Allergies   Allergen Reactions    Banana Itching    Kiwi Extract Itching    Seasonal Allergies     Zoloft [Sertraline] Hives       Problem List:    Patient Active Problem List    Diagnosis Date Noted    Delusional disorder (H) 12/05/2024     Priority: Medium    Dental caries 01/24/2018     Priority: Medium    ADHD, hyperactive-impulsive type 10/02/2015     Priority: Medium    Aggressive behavior 08/05/2014     Priority: Medium    Anxiety state 08/05/2014     Priority: Medium     Formatting of this note might be different from the original. IMO Update      Auditory hallucinations 08/05/2014     Priority: Medium    Homicidal ideations 08/05/2014     Priority: Medium    Suicidal ideation 08/05/2014     Priority: Medium    Autism spectrum disorder 03/13/2014     Priority: Medium    MARIBEL (generalized anxiety disorder) 03/13/2014     Priority: Medium    ADHD 07/26/2011     Priority: Medium    Other specified pervasive developmental disorders, current or active state 07/26/2011     Priority: Medium        Past Medical History:    Past Medical History:   Diagnosis Date    Anxiety disorder     Autistic disorder     Pervasive developmental disorder        Past Surgical History:     Past Surgical History:   Procedure Laterality Date    DENTAL SURGERY      No Comments Provided       Family History:    Family History   Problem Relation Age of Onset    Unknown/Adopted Father     No Known Problems Sister     No Known Problems Sister        Social History:  Marital Status:  Single [1]  Social History     Tobacco Use    Smoking status: Every Day     Types: Cigarettes, Vaping Device     Passive exposure: Never    Smokeless tobacco: Never   Vaping Use    Vaping status: Every Day   Substance Use Topics    Alcohol use: Yes     Comment: 2 drinks last night    Drug use: Yes     Types: Marijuana        Medications:    No current outpatient medications on file.    Review of Systems   Skin:  Positive for wound.   All other systems reviewed and are negative.      Physical Exam   BP: (!) 144/87  Pulse: 99  Temp: 98.5  F (36.9  C)  Resp: 16  Height: 182.9 cm (6')  SpO2: 93 %    Physical Exam  Vitals and nursing note reviewed.   Constitutional:       Appearance: He is not toxic-appearing.   HENT:      Mouth/Throat:      Mouth: Mucous membranes are moist.   Cardiovascular:      Rate and Rhythm: Normal rate and regular rhythm.      Pulses: Normal pulses.      Heart sounds: Normal heart sounds.   Pulmonary:      Effort: Pulmonary effort is normal.      Breath sounds: Normal breath sounds.   Musculoskeletal:      Cervical back: Normal range of motion.   Feet:      Right foot:      Skin integrity: Skin breakdown present.      Left foot:      Skin integrity: Skin breakdown present.   Skin:     Findings: Wound present.      Comments: Plantar surface of bilateral feet are painful, white firm wrinkled skin with surrounding erythema.    Neurological:      Mental Status: He is alert and oriented to person, place, and time.   Psychiatric:         Mood and Affect: Mood is anxious.      Comments: Restless. Agitated at times.                   Results for orders placed or performed during the hospital encounter of 02/25/25  (from the past 24 hours)   CBC with platelets differential    Narrative    The following orders were created for panel order CBC with platelets differential.  Procedure                               Abnormality         Status                     ---------                               -----------         ------                     CBC with platelets and d...[[id number]]  Abnormal            Final result                 Please view results for these tests on the individual orders.   Basic metabolic panel   Result Value Ref Range    Sodium 136 135 - 145 mmol/L    Potassium 3.2 (L) 3.4 - 5.3 mmol/L    Chloride 100 98 - 107 mmol/L    Carbon Dioxide (CO2) 20 (L) 22 - 29 mmol/L    Anion Gap 16 (H) 7 - 15 mmol/L    Urea Nitrogen 13.5 6.0 - 20.0 mg/dL    Creatinine 0.81 0.67 - 1.17 mg/dL    GFR Estimate >90 >60 mL/min/1.73m2    Calcium 9.6 8.8 - 10.4 mg/dL    Glucose 134 (H) 70 - 99 mg/dL   CBC with platelets and differential   Result Value Ref Range    WBC Count 9.5 4.0 - 11.0 10e3/uL    RBC Count 4.40 4.40 - 5.90 10e6/uL    Hemoglobin 13.0 (L) 13.3 - 17.7 g/dL    Hematocrit 36.3 (L) 40.0 - 53.0 %    MCV 83 78 - 100 fL    MCH 29.5 26.5 - 33.0 pg    MCHC 35.8 31.5 - 36.5 g/dL    RDW 12.8 10.0 - 15.0 %    Platelet Count 304 150 - 450 10e3/uL    % Neutrophils 69 %    % Lymphocytes 15 %    % Monocytes 15 %    % Eosinophils 1 %    % Basophils 1 %    % Immature Granulocytes 0 %    NRBCs per 100 WBC 0 <1 /100    Absolute Neutrophils 6.5 1.6 - 8.3 10e3/uL    Absolute Lymphocytes 1.4 0.8 - 5.3 10e3/uL    Absolute Monocytes 1.5 (H) 0.0 - 1.3 10e3/uL    Absolute Eosinophils 0.1 0.0 - 0.7 10e3/uL    Absolute Basophils 0.1 0.0 - 0.2 10e3/uL    Absolute Immature Granulocytes 0.0 <=0.4 10e3/uL    Absolute NRBCs 0.0 10e3/uL       Medications   ibuprofen (ADVIL/MOTRIN) tablet 600 mg (600 mg Oral $Given 2/25/25 5771)   potassium chloride (KAYCIEL) solution 20 mEq (20 mEq Oral $Given 2/26/25 0438)       Assessments & Plan (with Medical  Decision Making)  Yovanny Bonner is a 24 year old male who presents via police with a foot problem. Police reported to nursing that they found the pt walking on the side of the road. He is homeless and is having a foot problem. He is concerned that things that are coming out of his skin behind his ears are now coming out of his feet. He reports white particles falling off his feet and are present on his pants. The pt removed his boots and dressings that he had placed to bilateral feet. He reports the bottoms of his feet have been white, itchy, and painful for 2-3 weeks. He has been placing ace bandages and tape to the bottoms of his feet. He admits to using methamphetamine.   VS in the ED. BP (!) 144/87   Pulse 92   Temp 98.5  F (36.9  C)   Resp 30   Ht 1.829 m (6')   SpO2 97%   BMI 21.02 kg/m    Diagnostics:    Lab: Hgb 13.0. Potassium 3.2. carbon dioxide 20, anion gap- 16, glucose 134.     ED Course as of 02/26/25 1816 Wed Feb 26, 2025   0027 I consulted with Dr. Vargas, at [facility]'s burn. We discussed the case and images were sent. She recommends keeping the feet clean and dry. Place a non-adherent adaptic or xeroform and then a dry layer gauze. Change dressings daily. Change dressings daily and wash with mild soap. Recommends dry shoes. Her team will reach out to him to set up telemedicine follow up visits.      Yovanny is a 23 y/o male evaluated today for foot problems. The plantar surface of bilateral feet are white firm and wrinkly with surrounding erythema. The area is painful to the touch. The dorsal aspect of bilateral feet are intact with no erythema.   He is anxious and at times agitated. He is fixated that things are coming out of his skin. He admitted to nursing that he used methamphetamines a couple of days ago. He is homeless.   Exam findings consistent with non freezing cold injury (immersion foot) vs cold injury to plantar surface of bilateral feet. I consulted with Dr. Vargas, at [facility] burn  [place].  She recommends keeping the feet clean and dry.  Place a nonadherent Adaptic or Xeroform dressing and then cover with a dry layer of gauze.  She recommends changing the dressings daily and washing with a mild soap.  Her care team will reach out to him to set up a telemedicine follow-up visit.  She recommends a dry pair of shoes.   Given the patient is homeless, does not have dry shoes, and is currently impaired will plan to have social work see him in the morning to assist with additional resources for this patient. If willing I recommend detox and close follow up with wound care. Change of shift hand off given to Dr. Bailey.      I have reviewed the nursing notes.    I have reviewed the findings, diagnosis, plan and need for follow up with the patient.  Medical Decision Making  The patient's presentation was of low complexity (an acute and uncomplicated illness or injury).    The patient's evaluation involved:  an assessment requiring an independent historian (see separate area of note for details)  ordering and/or review of 2 test(s) in this encounter (see separate area of note for details)  discussion of management or test interpretation with another health professional (see separate area of note for details)    The patient's management necessitated moderate risk (prescription drug management including medications given in the ED) and further care after sign-out to Dr. Bailey (see their note for further management).    Final diagnoses:   Immersion foot, right foot, initial encounter   Immersion foot, left foot, initial encounter     2/25/2025   [facility] AND hospitals [place], SAULO ELLSWORTH  02/26/25 0122       Smita Katz DO  02/26/25 1816

## 2025-03-17 ENCOUNTER — HOSPITAL ENCOUNTER (EMERGENCY)
Facility: OTHER | Age: 25
Discharge: HOME | End: 2025-03-18
Attending: STUDENT IN AN ORGANIZED HEALTH CARE EDUCATION/TRAINING PROGRAM
Payer: COMMERCIAL

## 2025-03-17 VITALS
HEIGHT: 73 IN | OXYGEN SATURATION: 98 % | HEART RATE: 96 BPM | SYSTOLIC BLOOD PRESSURE: 117 MMHG | RESPIRATION RATE: 20 BRPM | BODY MASS INDEX: 21.87 KG/M2 | DIASTOLIC BLOOD PRESSURE: 76 MMHG | WEIGHT: 165 LBS | TEMPERATURE: 98 F

## 2025-03-17 DIAGNOSIS — G47.00 INSOMNIA, UNSPECIFIED TYPE: ICD-10-CM

## 2025-03-17 DIAGNOSIS — T73.0XXA HUNGRY, INITIAL ENCOUNTER: ICD-10-CM

## 2025-03-17 DIAGNOSIS — H61.001 PERICHONDRITIS OF EAR, RIGHT: ICD-10-CM

## 2025-03-17 PROCEDURE — 99282 EMERGENCY DEPT VISIT SF MDM: CPT | Performed by: STUDENT IN AN ORGANIZED HEALTH CARE EDUCATION/TRAINING PROGRAM

## 2025-03-17 PROCEDURE — 99283 EMERGENCY DEPT VISIT LOW MDM: CPT | Performed by: STUDENT IN AN ORGANIZED HEALTH CARE EDUCATION/TRAINING PROGRAM

## 2025-03-17 ASSESSMENT — COLUMBIA-SUICIDE SEVERITY RATING SCALE - C-SSRS
1. IN THE PAST MONTH, HAVE YOU WISHED YOU WERE DEAD OR WISHED YOU COULD GO TO SLEEP AND NOT WAKE UP?: NO
2. HAVE YOU ACTUALLY HAD ANY THOUGHTS OF KILLING YOURSELF IN THE PAST MONTH?: NO
6. HAVE YOU EVER DONE ANYTHING, STARTED TO DO ANYTHING, OR PREPARED TO DO ANYTHING TO END YOUR LIFE?: NO

## 2025-03-17 ASSESSMENT — ACTIVITIES OF DAILY LIVING (ADL)
ADLS_ACUITY_SCORE: 46

## 2025-03-18 ENCOUNTER — HOSPITAL ENCOUNTER (EMERGENCY)
Facility: OTHER | Age: 25
Discharge: HOME | End: 2025-03-18
Payer: COMMERCIAL

## 2025-03-18 ENCOUNTER — APPOINTMENT (OUTPATIENT)
Dept: GENERAL RADIOLOGY | Facility: OTHER | Age: 25
End: 2025-03-18
Payer: COMMERCIAL

## 2025-03-18 VITALS
HEART RATE: 86 BPM | BODY MASS INDEX: 21.87 KG/M2 | WEIGHT: 165 LBS | TEMPERATURE: 98.4 F | HEIGHT: 73 IN | OXYGEN SATURATION: 96 % | RESPIRATION RATE: 20 BRPM | DIASTOLIC BLOOD PRESSURE: 84 MMHG | SYSTOLIC BLOOD PRESSURE: 118 MMHG

## 2025-03-18 DIAGNOSIS — S60.312A ABRASION OF LEFT THUMB, INITIAL ENCOUNTER: ICD-10-CM

## 2025-03-18 DIAGNOSIS — Z59.00 HOMELESSNESS: ICD-10-CM

## 2025-03-18 DIAGNOSIS — H60.391 INFECTION OF EAR LOBE, RIGHT: ICD-10-CM

## 2025-03-18 DIAGNOSIS — R07.89 LEFT-SIDED CHEST WALL PAIN: ICD-10-CM

## 2025-03-18 LAB
ALBUMIN SERPL BCG-MCNC: 4.2 G/DL (ref 3.5–5.2)
ALP SERPL-CCNC: 99 U/L (ref 40–150)
ALT SERPL W P-5'-P-CCNC: 18 U/L (ref 0–70)
ANION GAP SERPL CALCULATED.3IONS-SCNC: 11 MMOL/L (ref 7–15)
AST SERPL W P-5'-P-CCNC: 23 U/L (ref 0–45)
BASOPHILS # BLD AUTO: 0 10E3/UL (ref 0–0.2)
BASOPHILS NFR BLD AUTO: 0 %
BILIRUB SERPL-MCNC: 0.2 MG/DL
BUN SERPL-MCNC: 9.7 MG/DL (ref 6–20)
CALCIUM SERPL-MCNC: 9.4 MG/DL (ref 8.8–10.4)
CHLORIDE SERPL-SCNC: 105 MMOL/L (ref 98–107)
CREAT SERPL-MCNC: 0.68 MG/DL (ref 0.67–1.17)
EGFRCR SERPLBLD CKD-EPI 2021: >90 ML/MIN/1.73M2
EOSINOPHIL # BLD AUTO: 0.1 10E3/UL (ref 0–0.7)
EOSINOPHIL NFR BLD AUTO: 1 %
ERYTHROCYTE [DISTWIDTH] IN BLOOD BY AUTOMATED COUNT: 13.2 % (ref 10–15)
GLUCOSE SERPL-MCNC: 103 MG/DL (ref 70–99)
HCO3 SERPL-SCNC: 24 MMOL/L (ref 22–29)
HCT VFR BLD AUTO: 40 % (ref 40–53)
HGB BLD-MCNC: 13.5 G/DL (ref 13.3–17.7)
IMM GRANULOCYTES # BLD: 0 10E3/UL
IMM GRANULOCYTES NFR BLD: 0 %
LIPASE SERPL-CCNC: 34 U/L (ref 13–60)
LYMPHOCYTES # BLD AUTO: 1.2 10E3/UL (ref 0.8–5.3)
LYMPHOCYTES NFR BLD AUTO: 17 %
MCH RBC QN AUTO: 29.4 PG (ref 26.5–33)
MCHC RBC AUTO-ENTMCNC: 33.8 G/DL (ref 31.5–36.5)
MCV RBC AUTO: 87 FL (ref 78–100)
MONOCYTES # BLD AUTO: 0.9 10E3/UL (ref 0–1.3)
MONOCYTES NFR BLD AUTO: 12 %
NEUTROPHILS # BLD AUTO: 4.9 10E3/UL (ref 1.6–8.3)
NEUTROPHILS NFR BLD AUTO: 69 %
NRBC # BLD AUTO: 0 10E3/UL
NRBC BLD AUTO-RTO: 0 /100
PLATELET # BLD AUTO: 386 10E3/UL (ref 150–450)
POTASSIUM SERPL-SCNC: 4.1 MMOL/L (ref 3.4–5.3)
PROT SERPL-MCNC: 7.6 G/DL (ref 6.4–8.3)
RBC # BLD AUTO: 4.59 10E6/UL (ref 4.4–5.9)
SODIUM SERPL-SCNC: 140 MMOL/L (ref 135–145)
WBC # BLD AUTO: 7.2 10E3/UL (ref 4–11)

## 2025-03-18 PROCEDURE — 36415 COLL VENOUS BLD VENIPUNCTURE: CPT

## 2025-03-18 PROCEDURE — 82435 ASSAY OF BLOOD CHLORIDE: CPT

## 2025-03-18 PROCEDURE — 85025 COMPLETE CBC W/AUTO DIFF WBC: CPT

## 2025-03-18 PROCEDURE — 84460 ALANINE AMINO (ALT) (SGPT): CPT

## 2025-03-18 PROCEDURE — 90715 TDAP VACCINE 7 YRS/> IM: CPT

## 2025-03-18 PROCEDURE — 99284 EMERGENCY DEPT VISIT MOD MDM: CPT

## 2025-03-18 PROCEDURE — 71046 X-RAY EXAM CHEST 2 VIEWS: CPT

## 2025-03-18 PROCEDURE — 90471 IMMUNIZATION ADMIN: CPT

## 2025-03-18 PROCEDURE — 99284 EMERGENCY DEPT VISIT MOD MDM: CPT | Mod: 25

## 2025-03-18 PROCEDURE — 83690 ASSAY OF LIPASE: CPT

## 2025-03-18 PROCEDURE — 250N000011 HC RX IP 250 OP 636

## 2025-03-18 RX ORDER — CLOTRIMAZOLE 1 %
CREAM (GRAM) TOPICAL 2 TIMES DAILY
Qty: 12 G | Refills: 0 | Status: SHIPPED | OUTPATIENT
Start: 2025-03-18 | End: 2025-04-01

## 2025-03-18 RX ORDER — CIPROFLOXACIN 750 MG/1
750 TABLET, FILM COATED ORAL 2 TIMES DAILY
Qty: 14 TABLET | Refills: 0 | Status: SHIPPED | OUTPATIENT
Start: 2025-03-18 | End: 2025-03-18

## 2025-03-18 RX ORDER — CIPROFLOXACIN 750 MG/1
750 TABLET, FILM COATED ORAL 2 TIMES DAILY
Qty: 14 TABLET | Refills: 0 | Status: SHIPPED | OUTPATIENT
Start: 2025-03-18

## 2025-03-18 RX ADMIN — CLOSTRIDIUM TETANI TOXOID ANTIGEN (FORMALDEHYDE INACTIVATED), CORYNEBACTERIUM DIPHTHERIAE TOXOID ANTIGEN (FORMALDEHYDE INACTIVATED), BORDETELLA PERTUSSIS TOXOID ANTIGEN (GLUTARALDEHYDE INACTIVATED), BORDETELLA PERTUSSIS FILAMENTOUS HEMAGGLUTININ ANTIGEN (FORMALDEHYDE INACTIVATED), BORDETELLA PERTUSSIS PERTACTIN ANTIGEN, AND BORDETELLA PERTUSSIS FIMBRIAE 2/3 ANTIGEN 0.5 ML: 5; 2; 2.5; 5; 3; 5 INJECTION, SUSPENSION INTRAMUSCULAR at 16:22

## 2025-03-18 SDOH — ECONOMIC STABILITY - HOUSING INSECURITY: HOMELESSNESS UNSPECIFIED: Z59.00

## 2025-03-18 ASSESSMENT — ACTIVITIES OF DAILY LIVING (ADL)
ADLS_ACUITY_SCORE: 46

## 2025-03-18 ASSESSMENT — ENCOUNTER SYMPTOMS: WOUND: 1

## 2025-03-18 NOTE — ED NOTES
Patient complains of tightness on left side of ribs. Tingling sensation  on left side of body. Patient also states he had a nail go through his left thumb from Sunday when he was helping someone with a roof.

## 2025-03-18 NOTE — ED TRIAGE NOTES
"Patient being evaluated today for multiple complaints. He C/O an external ear infection, cough, itching, tingling. He C/O pain in his chest, abdomen, shoulders, neck, head, and ear. Patient is hyperverbal in triage. /76   Pulse 96   Temp 98  F (36.7  C) (Tympanic)   Resp 20   Ht 1.854 m (6' 1\")   Wt 74.8 kg (165 lb)   SpO2 98%   BMI 21.77 kg/m         Triage Assessment (Adult)       Row Name 03/17/25 1942          Triage Assessment    Airway WDL WDL        Respiratory WDL    Respiratory WDL X;cough     Cough Frequency infrequent     Cough Type productive        Skin Circulation/Temperature WDL    Skin Circulation/Temperature WDL WDL        Cardiac WDL    Cardiac WDL WDL        Peripheral/Neurovascular WDL    Peripheral Neurovascular WDL WDL        Cognitive/Neuro/Behavioral WDL    Cognitive/Neuro/Behavioral WDL X;mood/behavior     Mood/Behavior restless;sad                     "

## 2025-03-18 NOTE — ED NOTES
Pt is homeless.   Pt will stay in the ER until morning and will be discharged then.  Felicia Oropeza RN on 3/17/2025 at 11:00 PM

## 2025-03-18 NOTE — DISCHARGE INSTRUCTIONS
Watch for infection: increased pain, swelling, pus-like drainage, fever,   Keep wound clean, dry covered. Wash with soap and water.   Ciprofloxacin twice a day for 7 days- sent to pharmacy. May possibly be fungal in nature, cream twice a day for the next 1-2 weeks to area.   Follow up with primary care provider at [facility] at end of the week for recheck, return here sooner if new, worsening complaints. Highly stress that you follow up for reevaluation  Tylenol, ibuprofen as needed for pain.    Labs, xray look ok.   Tetanus updated today.

## 2025-03-18 NOTE — ED PROVIDER NOTES
"  History     Chief Complaint   Patient presents with    Cough    Ear Drainage     Right ear    Chest Wall Pain     With coughing       Yovanny Bonner is a 24 year old male who presents with multiple concerns. No sleep 3 weeks. Walking 12 miles and during this walk heart felt \"condensed\" but currently feels normal. Described as \"deer's heart in a vacuum clean\". Pain on right ear for a couple years that radiates down and around neck. He has had this issue on left ear, but now it is on the right side. Using skin tegatry. His nostrils can crack at times and sometimes gets foul odor and drainage from his nose, \"like shit coming off his facial hair\". He has had this before and no one seems to know what's going on. He gets random tingles down both his legs. He has bumps on hist posterior neck with itchty scalp. Reports being in pain, being hungry, and being tired.    Allergies   Allergen Reactions    Banana Itching    Kiwi Extract Itching    Seasonal Allergies     Zoloft [Sertraline] Hives       Patient Active Problem List    Diagnosis Date Noted    Delusional disorder (H) 12/05/2024     Priority: Medium    Dental caries 01/24/2018     Priority: Medium    ADHD, hyperactive-impulsive type 10/02/2015     Priority: Medium    Aggressive behavior 08/05/2014     Priority: Medium    Anxiety state 08/05/2014     Priority: Medium     Formatting of this note might be different from the original. IMO Update      Auditory hallucinations 08/05/2014     Priority: Medium    Homicidal ideations 08/05/2014     Priority: Medium    Suicidal ideation 08/05/2014     Priority: Medium    Autism spectrum disorder 03/13/2014     Priority: Medium    MARIBEL (generalized anxiety disorder) 03/13/2014     Priority: Medium    ADHD 07/26/2011     Priority: Medium    Other specified pervasive developmental disorders, current or active state 07/26/2011     Priority: Medium       Past Medical History:   Diagnosis Date    Anxiety disorder     Autistic disorder  " "   Pervasive developmental disorder        Past Surgical History:   Procedure Laterality Date    DENTAL SURGERY      No Comments Provided       Family History   Problem Relation Age of Onset    Unknown/Adopted Father     No Known Problems Sister     No Known Problems Sister        Social History     Tobacco Use    Smoking status: Every Day     Types: Cigarettes, Vaping Device     Passive exposure: Never    Smokeless tobacco: Never   Vaping Use    Vaping status: Every Day   Substance Use Topics    Alcohol use: Yes     Comment: 2 drinks last night    Drug use: Yes     Types: Marijuana       Medications:    No current outpatient medications on file.      Review of Systems: See HPI for pertinent negatives and positives. All other systems reviewed and found to be negative.    Physical Exam   /76   Pulse 96   Temp 98  F (36.7  C) (Tympanic)   Resp 20   Ht 1.854 m (6' 1\")   Wt 74.8 kg (165 lb)   SpO2 98%   BMI 21.77 kg/m       General: lethargic but completely arousable to voice, comfortable, disheveled  HEENT: atraumatic, right ear lobule with erythema/edema/crusty brown dry discharge, right TM normal appearing, left ear and TM normal appearing  Respiratory: normal effort, clear to auscultation bilaterally  Cardiovascular: regular rate and rhythm, no murmurs  Abdomen: soft, nondistended, bilateral upper abdominal pain with some guarding  Extremities: no deformities, edema, or tenderness  Skin: warm, dry, right ear per above, dirt covered hands, no other wounds or lesions on exposed skin  Neuro: alert, no focal deficits  Psych: appropriate mood and affect, disorganized and delusional thought process    ED Course      No results found for this or any previous visit (from the past 24 hours).    Medications - No data to display    Assessments & Plan (with Medical Decision Making)     I have reviewed the nursing notes.    ED Course as of 03/18/25 0512   Mon Mar 17, 2025   2147 Refusing labs per RN   2149 Declining " PO antibiotic (cipro) with concern for right perichronditis   6635 We will board overnight for him to get rest before discharging to community. No signs of imminent danger to himself or others.        24 year old male evaluated for multiple concerns including right ear discomfort, insomnia, and hunger.  On exam there is some swelling and redness and crusting over the lower right ear that appears like perichronditis, as well as some guarding over his upper abdomen.  Patient refusing any labs.  Patient refusing p.o. antibiotics for his ear.  We did provide food for him and allowed him to sleep here overnight.  Discharging him to community (he is homeless).      I have reviewed the findings, diagnosis, plan, and need for any follow up with the patient.    New Prescriptions    No medications on file       Final diagnoses:   Hungry, initial encounter   Insomnia, unspecified type   Perichondritis of ear, right       3/17/2025   [facility] AND [facility]     Ronald Bailey MD  03/18/25 0139

## 2025-03-18 NOTE — ED NOTES
Cleaned left hand/thumb with hibiclens and warm water. Patient tolerated well. Patient refused the Adacel vaccine.

## 2025-03-18 NOTE — ED NOTES
This writer offered patient the opportunity for a shower. Patient agreeable. This writer gave him soap, shampoo, conditioner,lotion, and all hygiene products. Patient will shower after he eats lunch.

## 2025-03-18 NOTE — ED PROVIDER NOTES
History     Chief Complaint   Patient presents with    Facial Pain     Right sided    Tingling    Chest Wall Pain     The history is provided by the patient and medical records.     Yovanny Bnoner is a 24 year old male who presents to the ER today with his mother. Patient has multiple complaints. He is homeless, [place] bridge. Patient was seen here last night by Dr. Bailey, reviewing his note, complaints are similar to his presentation last night as well.   Patient reports pain and wound to his right ear lobe. going on for 3-4 weeks. Per Dr. Bailey's documentation, patient refused antibiotics last night to treat for concern for perichronditis .No fevers, changes in hearing.     Reports left sided chest pain with coughing, moving, tenderness. Going on for 3-4 days. Reports he was tackled by police about 5 months ago. Worried for broken rib.  Has an abrasion to left thumb from helping someone roof their home a couple of days ago.     Denies drug, alcohol use. No new trauma, falls.     Reports random shooting tingling down his entire body, mostly left leg, comes and goes, occurring for the past year. Lasts about 30 seconds at time.     Mother would like to talk to social work regarding homelessness, helping with medical POA paperwork. At this time patient is his own medical decision maker.   Patient denies SI today.Mother is not concerned for current SI.    PMH ADHD, hallucinations, autism, SI, HI, anxiety, methamphetamine use    Allergies:  Allergies   Allergen Reactions    Banana Itching    Kiwi Extract Itching    Seasonal Allergies     Zoloft [Sertraline] Hives       Problem List:    Patient Active Problem List    Diagnosis Date Noted    Delusional disorder (H) 12/05/2024     Priority: Medium    Dental caries 01/24/2018     Priority: Medium    ADHD, hyperactive-impulsive type 10/02/2015     Priority: Medium    Aggressive behavior 08/05/2014     Priority: Medium    Anxiety state 08/05/2014     Priority:  "Medium     Formatting of this note might be different from the original. IMO Update      Auditory hallucinations 08/05/2014     Priority: Medium    Homicidal ideations 08/05/2014     Priority: Medium    Suicidal ideation 08/05/2014     Priority: Medium    Autism spectrum disorder 03/13/2014     Priority: Medium    MARIBEL (generalized anxiety disorder) 03/13/2014     Priority: Medium    ADHD 07/26/2011     Priority: Medium    Other specified pervasive developmental disorders, current or active state 07/26/2011     Priority: Medium        Past Medical History:    Past Medical History:   Diagnosis Date    Anxiety disorder     Autistic disorder     Pervasive developmental disorder        Past Surgical History:    Past Surgical History:   Procedure Laterality Date    DENTAL SURGERY      No Comments Provided       Family History:    Family History   Problem Relation Age of Onset    Unknown/Adopted Father     No Known Problems Sister     No Known Problems Sister        Social History:  Marital Status:  Single [1]  Social History     Tobacco Use    Smoking status: Every Day     Types: Cigarettes, Vaping Device     Passive exposure: Never    Smokeless tobacco: Never   Vaping Use    Vaping status: Every Day   Substance Use Topics    Alcohol use: Yes     Comment: 2 drinks last night    Drug use: Yes     Types: Marijuana        Medications:    ciprofloxacin (CIPRO) 750 MG tablet  clotrimazole (LOTRIMIN) 1 % external cream          Review of Systems   HENT:  Positive for ear pain.    Musculoskeletal:         Left rib pain   Skin:  Positive for wound.   All other systems reviewed and are negative.  See HPI    Physical Exam   BP: 118/84  Pulse: 86  Temp: 98.4  F (36.9  C)  Resp: 20  Height: 185.4 cm (6' 1\")  Weight: 74.8 kg (165 lb)  SpO2: 96 %      Physical Exam  Vitals and nursing note reviewed.   Constitutional:       General: He is not in acute distress.     Appearance: He is not ill-appearing or toxic-appearing.   HENT:      " Head: Normocephalic.      Jaw: There is normal jaw occlusion.        Right Ear: Tympanic membrane normal.      Left Ear: Tympanic membrane normal.      Ears:      Comments: Right ear lob crusting, erythema      Nose: Nose normal.      Mouth/Throat:      Mouth: Mucous membranes are moist.   Eyes:      Extraocular Movements: Extraocular movements intact.      Pupils: Pupils are equal, round, and reactive to light.   Cardiovascular:      Rate and Rhythm: Normal rate and regular rhythm.      Pulses: Normal pulses.   Pulmonary:      Effort: Pulmonary effort is normal.      Breath sounds: Normal breath sounds.   Abdominal:      General: Abdomen is flat.      Palpations: Abdomen is soft.      Tenderness: There is no abdominal tenderness.   Musculoskeletal:         General: Normal range of motion.      Cervical back: Neck supple.   Skin:     General: Skin is warm.      Capillary Refill: Capillary refill takes less than 2 seconds.   Neurological:      General: No focal deficit present.      Mental Status: He is alert and oriented to person, place, and time.         ED Course         Results for orders placed or performed during the hospital encounter of 03/18/25 (from the past 24 hours)   CBC with platelets differential    Narrative    The following orders were created for panel order CBC with platelets differential.  Procedure                               Abnormality         Status                     ---------                               -----------         ------                     CBC with platelets and ...[[id number]]                      Final result                 Please view results for these tests on the individual orders.   Comprehensive metabolic panel   Result Value Ref Range    Sodium 140 135 - 145 mmol/L    Potassium 4.1 3.4 - 5.3 mmol/L    Carbon Dioxide (CO2) 24 22 - 29 mmol/L    Anion Gap 11 7 - 15 mmol/L    Urea Nitrogen 9.7 6.0 - 20.0 mg/dL    Creatinine 0.68 0.67 - 1.17 mg/dL    GFR Estimate >90 >60  mL/min/1.73m2    Calcium 9.4 8.8 - 10.4 mg/dL    Chloride 105 98 - 107 mmol/L    Glucose 103 (H) 70 - 99 mg/dL    Alkaline Phosphatase 99 40 - 150 U/L    AST 23 0 - 45 U/L    ALT 18 0 - 70 U/L    Protein Total 7.6 6.4 - 8.3 g/dL    Albumin 4.2 3.5 - 5.2 g/dL    Bilirubin Total 0.2 <=1.2 mg/dL   Lipase   Result Value Ref Range    Lipase 34 13 - 60 U/L   CBC with platelets and differential   Result Value Ref Range    WBC Count 7.2 4.0 - 11.0 10e3/uL    RBC Count 4.59 4.40 - 5.90 10e6/uL    Hemoglobin 13.5 13.3 - 17.7 g/dL    Hematocrit 40.0 40.0 - 53.0 %    MCV 87 78 - 100 fL    MCH 29.4 26.5 - 33.0 pg    MCHC 33.8 31.5 - 36.5 g/dL    RDW 13.2 10.0 - 15.0 %    Platelet Count 386 150 - 450 10e3/uL    % Neutrophils 69 %    % Lymphocytes 17 %    % Monocytes 12 %    % Eosinophils 1 %    % Basophils 0 %    % Immature Granulocytes 0 %    NRBCs per 100 WBC 0 <1 /100    Absolute Neutrophils 4.9 1.6 - 8.3 10e3/uL    Absolute Lymphocytes 1.2 0.8 - 5.3 10e3/uL    Absolute Monocytes 0.9 0.0 - 1.3 10e3/uL    Absolute Eosinophils 0.1 0.0 - 0.7 10e3/uL    Absolute Basophils 0.0 0.0 - 0.2 10e3/uL    Absolute Immature Granulocytes 0.0 <=0.4 10e3/uL    Absolute NRBCs 0.0 10e3/uL   XR Chest 2 Views    Narrative    PROCEDURE:  XR CHEST 2 VIEWS    HISTORY: left sided chest discomfort ? ribs? tenderness, .    COMPARISON:  None.    FINDINGS:  The cardiomediastinal contours are normal. The trachea is midline.  No focal consolidation, effusion or pneumothorax.  Lung volumes are  upper normal.  No suspicious osseous lesion or subdiaphragmatic free air.      Impression    IMPRESSION:      No acute cardiopulmonary process.      YELENA MEYER MD         SYSTEM ID:  [id number]       Medications   Tdap (tetanus-diphtheria-acell pertussis) (ADACEL) injection 0.5 mL (0.5 mLs Intramuscular Not Given 3/18/25 1411)   Tdap (tetanus-diphtheria-acell pertussis) (ADACEL) injection 0.5 mL (0.5 mLs Intramuscular $Given 3/18/25 1626)       Assessments &  Plan (with Medical Decision Making)  Yovanny Bonner is a 24 year old male who presents to the ER today with his mother. Patient has multiple complaints. He is homeless, [place] bridge. Patient was seen here last night by Dr. Bailey, reviewing his note, complaints are similar to his presentation last night as well.   Patient reports pain and wound to his right ear lobe. going on for 3-4 weeks. Per Dr. Bailey's documentation, patient refused antibiotics last night.  Reports left sided chest pain with coughing, moving, tenderness. Going on for 3-4 days. Reports he was tackled by police about 5 months ago. Worried for broken rib.  Has an abrasion to left thumb from helping someone roof their home a couple of days ago.   No fevers, changes in hearing.   Denies drug, alcohol use. No new trauma, falls.   Reports random shooting tingling down his entire body, mostly left leg, comes and goes, occurring for the past year. Lasts about 30 seconds at time.   Mother would like to talk to social work regarding homelessness, helping with medical POA paperwork. At this time patient is his own medical decision maker.   Patient denies SI today.Mother is not concerned for current SI.  PMH ADHD, hallucinations, autism, SI, HI, anxiety, methamphetamine use  Patient is alert and oriented nondistressed nontoxic-appearing.  Physical exam shows washburn/brown crusting and erythema to right earlobe.  Ear canal and TMs bilaterally are normal.  Skin around his ear appears normal.  Pinna and cartilage appears normal.  He is vitally stable and afebrile.  Lung sounds are clear breathing is easy and unlabored.  He has tenderness to the left anterior and posterior chest wall with palpation.  No bruising, rash, erythema, crepitus.  He is moving all of his extremities appropriately.  He is steady on his feet.  Do not appreciate any acute neurological deficits upon exam.  Abdomen is soft, flat.  He tells me that he is hungry and is requesting  that we give him [employer] here.  He has an abrasion to his left thumb area that does not appear to be acutely infected, there is a scab to the area.  He has full range of motion.  We did clean the area.    CBC: normal.  No leukocytosis.  CMP: Stable, glucose 103, lipase normal  Chest x-ray: stable  Medications: Patient declined oral ciprofloxacin here offered for potential perichondritis to the right ear.  He is excepting to have this medication sent to his pharmacy mother will  the antibiotic.    Tetanus updated  There is a potential that this may be a fungal infection, I did discuss this with mother, prescription was given for lotrimin cream to his pharmacy as well.  Social work spoke with patient, mother.  Please see Malathi's note.  Local resources given.  I reviewed lab work, x-ray results with patient and his mother.  I do not appreciate  emergent findings today.  He has been sleeping throughout most of his stay in the emergency department and has been eating meals.  Discharge medications and side effects explained to patient per his request.  Wound care and signs and symptoms of infection explained to patient and mother.Advised that he follow-up with his primary care provider at [facility] for reevaluation in the next week.  Verbal understanding of discharge instructions were given.  Patient will discharge home with his mother, who will bring him to another family member's house to stay for the time being.     I have reviewed the nursing notes.    I have reviewed the findings, diagnosis, plan and need for follow up with the patient.    Medical Decision Making  The patient's presentation was of moderate complexity (social work, multiple complaints).    The patient's evaluation involved:  review of external note(s) from 1 sources (see separate area of note for details)  review of 1 test result(s) ordered prior to this encounter (see separate area of note for details)  ordering and/or review of 3+ test(s)  in this encounter (see separate area of note for details)    The patient's management necessitated moderate risk (prescription drug management including medications given in the ED).        Discharge Medication List as of 3/18/2025  5:25 PM        START taking these medications    Details   clotrimazole (LOTRIMIN) 1 % external cream Apply topically 2 times daily for 14 days.Disp-12 g, [id number] to ear lobe twice daily             Final diagnoses:   Infection of ear lobe, right   Left-sided chest wall pain   Homelessness   Abrasion of left thumb, initial encounter       3/18/2025   [facility] SAULO CNP  03/19/25 8126

## 2025-03-18 NOTE — ED TRIAGE NOTES
"Patient being re-evaluated today for multiple C/O. He is mainly C/O right external ear infection, which is causing pressure in his face, head, and neck. He also reports left-sided chest wall pain that extends from his axilla to his abdomen and intermittent \"body tingling.\" Patient is here with his aunt today. He was evaluated yesterday in the ER. /84   Pulse 86   Temp 98.4  F (36.9  C) (Tympanic)   Resp 20   Ht 1.854 m (6' 1\")   Wt 74.8 kg (165 lb)   SpO2 96%   BMI 21.77 kg/m           Triage Assessment (Adult)       Row Name 03/18/25 1133          Triage Assessment    Airway WDL WDL        Respiratory WDL    Respiratory WDL X  chest wall pain        Skin Circulation/Temperature WDL    Skin Circulation/Temperature WDL WDL        Cardiac WDL    Cardiac WDL WDL        Peripheral/Neurovascular WDL    Peripheral Neurovascular WDL WDL        Cognitive/Neuro/Behavioral WDL    Cognitive/Neuro/Behavioral WDL X;mood/behavior     Mood/Behavior anxious                     "

## 2025-03-18 NOTE — PROGRESS NOTES
:    Met with patient and his mother Twan in room.  Patient stated he is currently living in an ice house heated by propane and it has cooking capabilities.  Mother states patient cannot live with her as she is in a camper with her significant other and someone else.    Patient stated he would like to have his mother sign the form that would give her permission  to make appointments and have medical information as needed.  Patient completed authorization to share protected health information form.    Patient mentioned that he may want him mother to be his guardian.      Patient currently received a phone, but does not have any cards for minutes.  Gave beckman cash to patient so he can obtain a card for his phone.  Food bag given to patient.    Patient is requesting a  from [facility].  Will complete intake form and send in when completed.     Patient stated he has already called first call for help point of entry to get on the housing list.  Patient stated he already knows all the other resources in town.    AGUSTÍN Vo on 3/18/2025 at 1:37 PM

## 2025-07-24 ENCOUNTER — OFFICE VISIT (OUTPATIENT)
Dept: FAMILY MEDICINE | Facility: OTHER | Age: 25
End: 2025-07-24

## 2025-07-24 VITALS
HEIGHT: 73 IN | TEMPERATURE: 98.4 F | HEART RATE: 100 BPM | WEIGHT: 148 LBS | OXYGEN SATURATION: 95 % | SYSTOLIC BLOOD PRESSURE: 128 MMHG | BODY MASS INDEX: 19.61 KG/M2 | RESPIRATION RATE: 25 BRPM | DIASTOLIC BLOOD PRESSURE: 82 MMHG

## 2025-07-24 DIAGNOSIS — G44.201 ACUTE INTRACTABLE TENSION-TYPE HEADACHE: Primary | ICD-10-CM

## 2025-07-24 PROCEDURE — 250N000013 HC RX MED GY IP 250 OP 250 PS 637

## 2025-07-24 PROCEDURE — 96372 THER/PROPH/DIAG INJ SC/IM: CPT

## 2025-07-24 PROCEDURE — 250N000009 HC RX 250

## 2025-07-24 PROCEDURE — 250N000011 HC RX IP 250 OP 636: Mod: JZ

## 2025-07-24 RX ORDER — DIPHENHYDRAMINE HCL 25 MG
25 CAPSULE ORAL ONCE
Status: COMPLETED | OUTPATIENT
Start: 2025-07-24 | End: 2025-07-24

## 2025-07-24 RX ORDER — KETOROLAC TROMETHAMINE 30 MG/ML
30 INJECTION, SOLUTION INTRAMUSCULAR; INTRAVENOUS ONCE
Status: COMPLETED | OUTPATIENT
Start: 2025-07-24 | End: 2025-07-24

## 2025-07-24 RX ORDER — DEXAMETHASONE SODIUM PHOSPHATE 4 MG/ML
12 VIAL (ML) INJECTION ONCE
Status: COMPLETED | OUTPATIENT
Start: 2025-07-24 | End: 2025-07-24

## 2025-07-24 RX ADMIN — DIPHENHYDRAMINE HYDROCHLORIDE 25 MG: 25 CAPSULE ORAL at 16:03

## 2025-07-24 RX ADMIN — DEXAMETHASONE SODIUM PHOSPHATE 12 MG: 4 INJECTION, SOLUTION INTRAMUSCULAR; INTRAVENOUS at 16:06

## 2025-07-24 RX ADMIN — KETOROLAC TROMETHAMINE 30 MG: 30 INJECTION, SOLUTION INTRAMUSCULAR at 16:06

## 2025-07-24 ASSESSMENT — PAIN SCALES - GENERAL: PAINLEVEL_OUTOF10: SEVERE PAIN (10)

## 2025-07-24 NOTE — PROGRESS NOTES
ASSESSMENT/PLAN:    I have reviewed the nursing notes.  I have reviewed the findings, diagnosis, plan and need for follow up with the patient.    1. Acute intractable tension-type headache (Primary)  - dexAMETHasone (DECADRON) injectable solution used ORALLY 12 mg  - diphenhydrAMINE (BENADRYL) capsule 25 mg  - ketorolac (TORADOL) injection 30 mg    Patient presents with a tension headache.  Vitals are stable and neurological exam was negative for any abnormalities.  Patient was treated with a migraine cocktail in clinic.  Advised patient to stay well-hydrated, try and reduce stress/anxiety, and avoid alcohol, tobacco, and caffeine.  Take Tylenol and ibuprofen as needed.  Advised patient that he should follow-up with primary care.    Discussed warning signs/symptoms indicative of need to f/u    Follow up if symptoms persist or worsen or concerns    I explained my diagnostic considerations and recommendations to the patient, who voiced understanding and agreement with the treatment plan. All questions were answered. We discussed potential side effects of any prescribed or recommended therapies, as well as expectations for response to treatments.    SAULO Payan CNP  2025  3:33 PM    HPI:    Yovanny Bonner is a 24 year old male who presents to [facility] today for concerns of tension headache    Headache  Onset: 1 day(s) ago  Description:                Type: Muscle tension headache                 Location: bilateral occipital area   Character: dull pain, sharp pain                 Frequency:  constant                 Duration:  started this morning but he has been experiencing these headaches for months                 Pain scale ratin/10                 Are headaches getting more intense or more frequent: YES  Precipitating and/or Alleviating factors:         Does movement, bending over, increase pain: YES  Does light/sound make it worse: No  Does sleep help: No  Do you wake up with a headache or  "does it wake you from sleep: YES                 Are you able to do daily activities: sometimes  Accompanying Signs & Symptoms:   Stiff neck: No  Fever: No  Confusion: No  Sinus pressure: No  Nausea or vomiting: YES  Dizziness: YES  Numbness: No  Weakness: No  Visual changes: No  History:    Any head trauma: No  Any previously history of headaches: YES- hx of tension headaches  Any family history of migraines: YES  Any previous tests for headaches: no  Have you seen a neurologist before: No  Medications tried and outcome:  Tylenol with minor relief      Past Medical History:   Diagnosis Date    Anxiety disorder     No Comments Provided    Autistic disorder     No Comments Provided    Pervasive developmental disorder     and Spectrum disorder diagnosed by psychiatry in [place].     Past Surgical History:   Procedure Laterality Date    DENTAL SURGERY      No Comments Provided     Social History     Tobacco Use    Smoking status: Every Day     Types: Cigarettes, Vaping Device     Passive exposure: Never    Smokeless tobacco: Never   Substance Use Topics    Alcohol use: Yes     Comment: 2 drinks last night     No current outpatient medications on file.     Allergies   Allergen Reactions    Banana Itching    Kiwi Extract Itching    Seasonal Allergies     Zoloft [Sertraline] Hives     Past medical history, past surgical history, current medications and allergies reviewed and accurate to the best of my knowledge.      ROS:  Refer to HPI    /82 (BP Location: Left arm, Patient Position: Sitting, Cuff Size: Adult Regular)   Pulse 100   Temp 98.4  F (36.9  C) (Tympanic)   Resp 25   Ht 1.854 m (6' 1\")   Wt 67.1 kg (148 lb)   SpO2 95%   BMI 19.53 kg/m      EXAM:  General Appearance: Well appearing 24 year old male, appropriate appearance for age. No acute distress   Ears: Left TM intact, translucent with bony landmarks appreciated, no erythema, no effusion, no bulging, no purulence.  Right TM intact, translucent " with bony landmarks appreciated, no erythema, no effusion, no bulging, no purulence.  Left auditory canal clear.  Right auditory canal clear.  Normal external ears, non tender.  Eyes: conjunctivae normal without erythema or irritation, corneas clear, no drainage or crusting, no eyelid swelling, pupils equal   Oropharynx: moist mucous membranes, posterior pharynx without erythema, tonsils symmetric and 1+, no erythema, no exudates or petechiae, no post nasal drip seen, no trismus, voice clear.    Nose:  Bilateral nares: no erythema, no edema, no drainage or congestion   Neck: supple without adenopathy  Musculoskeletal:  Equal movement of bilateral upper extremities.  Equal movement of bilateral lower extremities.  Normal gait.    Dermatological: no rashes noted of exposed skin  Neuro: Alert and oriented to person, place, and time.  Cranial nerves II-XII grossly intact with no focal or lateralizing deficits.  Muscle tone normal.  Gait normal. No tremor.   Psychological: normal affect, alert, oriented, and pleasant.

## 2025-07-24 NOTE — PROGRESS NOTES
"Chief Complaint   Patient presents with    Derm Problem   Patient is here to be seen for rash on scalp.     FOOD SECURITY SCREENING QUESTIONS  Hunger Vital Signs:  Within the past 12 months we worried whether our food would run out before we got money to buy more. Never  Within the past 12 months the food we bought just didn't last and we didn't have money to get more. Never  Karely Merrill 7/24/2025 3:21 PM      Initial /82 (BP Location: Left arm, Patient Position: Sitting, Cuff Size: Adult Regular)   Pulse 100   Temp 98.4  F (36.9  C) (Tympanic)   Resp 25   Ht 1.854 m (6' 1\")   Wt 67.1 kg (148 lb)   SpO2 95%   BMI 19.53 kg/m   Estimated body mass index is 19.53 kg/m  as calculated from the following:    Height as of this encounter: 1.854 m (6' 1\").    Weight as of this encounter: 67.1 kg (148 lb).  Medication Reconciliation: complete    Karely Merrill   "

## 2025-07-24 NOTE — PROGRESS NOTES
Patient identified as experiencing food insecurity or needing support:  Yes     Food Insecurity: High Risk (11/2/2024)    Food Insecurity     Within the past 12 months, did you worry that your food would run out before you got money to buy more?: Yes     Within the past 12 months, did the food you bought just not last and you didn t have money to get more?: Yes       Referred to or enrolled in:  [employer]helf[employer] food bags (Every Meal)  Patient provided verbal consent to share contact information with. No

## (undated) RX ORDER — KETOROLAC TROMETHAMINE 30 MG/ML
INJECTION, SOLUTION INTRAMUSCULAR; INTRAVENOUS
Status: DISPENSED
Start: 2025-07-24

## (undated) RX ORDER — IBUPROFEN 200 MG
TABLET ORAL
Status: DISPENSED
Start: 2025-02-25

## (undated) RX ORDER — DIPHENHYDRAMINE HCL 25 MG
CAPSULE ORAL
Status: DISPENSED
Start: 2025-07-24

## (undated) RX ORDER — KETOROLAC TROMETHAMINE 15 MG/ML
INJECTION, SOLUTION INTRAMUSCULAR; INTRAVENOUS
Status: DISPENSED
Start: 2019-10-10

## (undated) RX ORDER — HYDROXYZINE PAMOATE 25 MG/1
CAPSULE ORAL
Status: DISPENSED
Start: 2024-12-31

## (undated) RX ORDER — SODIUM CHLORIDE 9 MG/ML
INJECTION, SOLUTION INTRAVENOUS
Status: DISPENSED
Start: 2019-10-10

## (undated) RX ORDER — GINSENG 100 MG
CAPSULE ORAL
Status: DISPENSED
Start: 2024-12-05

## (undated) RX ORDER — IBUPROFEN 200 MG
TABLET ORAL
Status: DISPENSED
Start: 2023-12-16

## (undated) RX ORDER — KETOROLAC TROMETHAMINE 30 MG/ML
INJECTION, SOLUTION INTRAMUSCULAR; INTRAVENOUS
Status: DISPENSED
Start: 2023-12-16

## (undated) RX ORDER — SULFAMETHOXAZOLE AND TRIMETHOPRIM 800; 160 MG/1; MG/1
TABLET ORAL
Status: DISPENSED
Start: 2024-12-31

## (undated) RX ORDER — IBUPROFEN 400 MG/1
TABLET, FILM COATED ORAL
Status: DISPENSED
Start: 2024-12-31

## (undated) RX ORDER — POTASSIUM CHLORIDE 20MEQ/15ML
LIQUID (ML) ORAL
Status: DISPENSED
Start: 2025-02-26

## (undated) RX ORDER — IBUPROFEN 400 MG/1
TABLET, FILM COATED ORAL
Status: DISPENSED
Start: 2025-02-25

## (undated) RX ORDER — DEXAMETHASONE SODIUM PHOSPHATE 4 MG/ML
INJECTION, SOLUTION INTRA-ARTICULAR; INTRALESIONAL; INTRAMUSCULAR; INTRAVENOUS; SOFT TISSUE
Status: DISPENSED
Start: 2025-07-24

## (undated) RX ORDER — HYDROCODONE BITARTRATE AND ACETAMINOPHEN 5; 325 MG/1; MG/1
TABLET ORAL
Status: DISPENSED
Start: 2024-12-31

## (undated) RX ORDER — IBUPROFEN 200 MG
TABLET ORAL
Status: DISPENSED
Start: 2024-12-31

## (undated) RX ORDER — AZITHROMYCIN 250 MG/1
TABLET, FILM COATED ORAL
Status: DISPENSED
Start: 2020-08-29